# Patient Record
Sex: MALE | Race: BLACK OR AFRICAN AMERICAN | NOT HISPANIC OR LATINO | Employment: FULL TIME | URBAN - METROPOLITAN AREA
[De-identification: names, ages, dates, MRNs, and addresses within clinical notes are randomized per-mention and may not be internally consistent; named-entity substitution may affect disease eponyms.]

---

## 2018-06-04 ENCOUNTER — HOSPITAL ENCOUNTER (EMERGENCY)
Facility: CLINIC | Age: 33
Discharge: HOME | End: 2018-06-05
Attending: EMERGENCY MEDICINE | Admitting: EMERGENCY MEDICINE
Payer: COMMERCIAL

## 2018-06-04 DIAGNOSIS — R10.32 LEFT INGUINAL PAIN: ICD-10-CM

## 2018-06-04 DIAGNOSIS — L03.116 CELLULITIS OF LEFT LOWER EXTREMITY: ICD-10-CM

## 2018-06-04 DIAGNOSIS — R73.9 HYPERGLYCEMIA: ICD-10-CM

## 2018-06-04 LAB
BASOPHILS # BLD AUTO: 0.1 10E9/L (ref 0–0.2)
BASOPHILS NFR BLD AUTO: 0.8 %
DIFFERENTIAL METHOD BLD: NORMAL
EOSINOPHIL # BLD AUTO: 0.1 10E9/L (ref 0–0.7)
EOSINOPHIL NFR BLD AUTO: 1.4 %
ERYTHROCYTE [DISTWIDTH] IN BLOOD BY AUTOMATED COUNT: 13 % (ref 10–15)
GLUCOSE BLDC GLUCOMTR-MCNC: 381 MG/DL (ref 70–99)
HCT VFR BLD AUTO: 49 % (ref 40–53)
HGB BLD-MCNC: 16.4 G/DL (ref 13.3–17.7)
IMM GRANULOCYTES # BLD: 0 10E9/L (ref 0–0.4)
IMM GRANULOCYTES NFR BLD: 0.3 %
LACTATE BLD-SCNC: 1.2 MMOL/L (ref 0.7–2)
LYMPHOCYTES # BLD AUTO: 3.1 10E9/L (ref 0.8–5.3)
LYMPHOCYTES NFR BLD AUTO: 44 %
MCH RBC QN AUTO: 29.2 PG (ref 26.5–33)
MCHC RBC AUTO-ENTMCNC: 33.5 G/DL (ref 31.5–36.5)
MCV RBC AUTO: 87 FL (ref 78–100)
MONOCYTES # BLD AUTO: 0.5 10E9/L (ref 0–1.3)
MONOCYTES NFR BLD AUTO: 6.8 %
NEUTROPHILS # BLD AUTO: 3.3 10E9/L (ref 1.6–8.3)
NEUTROPHILS NFR BLD AUTO: 46.7 %
NRBC # BLD AUTO: 0 10*3/UL
NRBC BLD AUTO-RTO: 0 /100
PLATELET # BLD AUTO: 194 10E9/L (ref 150–450)
RBC # BLD AUTO: 5.61 10E12/L (ref 4.4–5.9)
WBC # BLD AUTO: 7.1 10E9/L (ref 4–11)

## 2018-06-04 PROCEDURE — 83605 ASSAY OF LACTIC ACID: CPT | Performed by: EMERGENCY MEDICINE

## 2018-06-04 PROCEDURE — 99284 EMERGENCY DEPT VISIT MOD MDM: CPT | Mod: 25

## 2018-06-04 PROCEDURE — 25000128 H RX IP 250 OP 636: Performed by: EMERGENCY MEDICINE

## 2018-06-04 PROCEDURE — 36415 COLL VENOUS BLD VENIPUNCTURE: CPT | Performed by: EMERGENCY MEDICINE

## 2018-06-04 PROCEDURE — 25000131 ZZH RX MED GY IP 250 OP 636 PS 637: Performed by: EMERGENCY MEDICINE

## 2018-06-04 PROCEDURE — 87040 BLOOD CULTURE FOR BACTERIA: CPT | Performed by: EMERGENCY MEDICINE

## 2018-06-04 PROCEDURE — 96365 THER/PROPH/DIAG IV INF INIT: CPT

## 2018-06-04 PROCEDURE — 85025 COMPLETE CBC W/AUTO DIFF WBC: CPT | Performed by: EMERGENCY MEDICINE

## 2018-06-04 PROCEDURE — 00000146 ZZHCL STATISTIC GLUCOSE BY METER IP

## 2018-06-04 PROCEDURE — 80048 BASIC METABOLIC PNL TOTAL CA: CPT | Performed by: EMERGENCY MEDICINE

## 2018-06-04 RX ORDER — SODIUM CHLORIDE 9 MG/ML
1000 INJECTION, SOLUTION INTRAVENOUS CONTINUOUS
Status: DISCONTINUED | OUTPATIENT
Start: 2018-06-04 | End: 2018-06-05 | Stop reason: HOSPADM

## 2018-06-04 RX ORDER — ONDANSETRON 2 MG/ML
4 INJECTION INTRAMUSCULAR; INTRAVENOUS
Status: DISCONTINUED | OUTPATIENT
Start: 2018-06-04 | End: 2018-06-05 | Stop reason: HOSPADM

## 2018-06-04 RX ORDER — MORPHINE SULFATE 4 MG/ML
4 INJECTION, SOLUTION INTRAMUSCULAR; INTRAVENOUS
Status: DISCONTINUED | OUTPATIENT
Start: 2018-06-04 | End: 2018-06-05

## 2018-06-04 RX ORDER — LIDOCAINE 40 MG/G
CREAM TOPICAL
Status: DISCONTINUED | OUTPATIENT
Start: 2018-06-04 | End: 2018-06-05 | Stop reason: HOSPADM

## 2018-06-04 RX ADMIN — TAZOBACTAM SODIUM AND PIPERACILLIN SODIUM 3.38 G: 375; 3 INJECTION, SOLUTION INTRAVENOUS at 23:48

## 2018-06-04 RX ADMIN — INSULIN HUMAN 16 UNITS: 100 INJECTION, SOLUTION PARENTERAL at 23:50

## 2018-06-04 RX ADMIN — SODIUM CHLORIDE 1000 ML: 9 INJECTION, SOLUTION INTRAVENOUS at 23:47

## 2018-06-04 ASSESSMENT — ENCOUNTER SYMPTOMS: DYSURIA: 1

## 2018-06-04 NOTE — ED AVS SNAPSHOT
[facility] Emergency Department    [address] [place] [address]    Phone:  [phone]    Fax:  [phone]                                       Abrahan Tucker   MRN: [id number]    Department:  [facility] Emergency Department   Date of Visit:  6/4/2018           Patient Information     Date Of Birth          1985        Your diagnoses for this visit were:     Cellulitis of left lower extremity     Hyperglycemia     Left inguinal pain        You were seen by Parmjit Schulz MD and Abelino Heaton MD.      Follow-up Information     Follow up with [facility]cd[facility]. Schedule an appointment as soon as possible for a visit in 2 days.    Specialty:  Family Practice    Contact information:    [facility]  [address]   [facility] [address]  [phone]          Discharge Instructions         Discharge Instructions  Cellulitis    Cellulitis is an infection of the skin that occurs when bacteria enter the skin.   Symptoms are generally redness, swelling, warmth and pain.  Your infection appeared to be appropriate to treat at home with antibiotics.  However, sometimes your infection may be worse than it seemed at first, or may worsen with time. If you have new or worse symptoms, you may need to be seen again in the Emergency Department or by your primary doctor.    Return to the Emergency Department if:    The redness, pain, or swelling gets a lot worse.  If the red area was marked, return if it is red beyond the marked area.    You are unable to get your antibiotics, or are vomiting them up or you can t take them.    You are feeling more ill, weak or lightheaded.    You start to run a new fever (temperature >101).    Anything else about the infection worries or concerns you.  Treatment:    Start your antibiotics right away, and take them as prescribed. Be sure to finish the whole prescription, even if you are better.    Apply a heating pad, warm  "packs, or warm water soaks to the infected area for 15 minutes at a time, at least 3 times a day. Do not use a heating pad on your feet or legs if you have diabetes. Do not sleep with a heating pad on, since this can cause burns or skin injury.    Rest your injured area for at least 1-2 days. After that you may start using your extremity again as long as there is not too much pain.     Raise the injured area above the level of your heart as much as possible in the first 1-2 days.    Tylenol  (acetaminophen), Motrin  (ibuprofen), or Advil  (ibuprofen) may help may help reduce pain and fever and may help you feel more comfortable. Be sure to read and follow the package directions, and ask your doctor if you have questions.    Follow-up with your doctor:    Re-check in clinic within 2-3 days.  Probiotics: If you have been given an antibiotic, you may want to also take a probiotic pill or eat yogurt with live cultures. Probiotics have \"good bacteria\" to help your intestines stay healthy. Studies have shown that probiotics help prevent diarrhea and other intestine problems (including C. diff infection) when you take antibiotics. You can buy these without a prescription in the pharmacy section of the store.     If you were given a prescription for medicine here today, be sure to read all of the information (including the package insert) that comes with your prescription.  This will include important information about the medicine, its side effects, and any warnings that you need to know about.  The pharmacist who fills the prescription can provide more information and answer questions you may have about the medicine.  If you have questions or concerns that the pharmacist cannot address, please call or return to the Emergency Department.     Opioid Medication Information    Pain medications are among the most commonly prescribed medicines, so we are including this information for all our patients. If you did not receive " pain medication or get a prescription for pain medicine, you can ignore it.     You may have been given a prescription for an opioid (narcotic) pain medicine and/or have received a pain medicine while here in the Emergency Department. These medicines can make you drowsy or impaired. You must not drive, operate dangerous equipment, or engage in any other dangerous activities while taking these medications. If you drive while taking these medications, you could be arrested for DUI, or driving under the influence. Do not drink any alcohol while you are taking these medications.     Opioid pain medications can cause addiction. If you have a history of chemical dependency of any type, you are at a higher risk of becoming addicted to pain medications.  Only take these prescribed medications to treat your pain when all other options have been tried. Take it for as short a time and as few doses as possible. Store your pain pills in a secure place, as they are frequently stolen and provide a dangerous opportunity for children or visitors in your house to start abusing these powerful medications. We will not replace any lost or stolen medicine.  As soon as your pain is better, you should flush all your remaining medication.     Many prescription pain medications contain Tylenol  (acetaminophen), including Vicodin , Tylenol #3 , Norco , Lortab , and Percocet .  You should not take any extra pills of Tylenol  if you are using these prescription medications or you can get very sick.  Do not ever take more than 3000 mg of acetaminophen in any 24 hour period.    All opioids tend to cause constipation. Drink plenty of water and eat foods that have a lot of fiber, such as fruits, vegetables, prune juice, apple juice and high fiber cereal.  Take a laxative if you don t move your bowels at least every other day. Miralax , Milk of Magnesia, Colace , or Senna  can be used to keep you regular.      Remember that you can always come back  to the Emergency Department if you are not able to see your regular doctor in the amount of time listed above, if you get any new symptoms, or if there is anything that worries you.        24 Hour Appointment Hotline       To make an appointment at any [facility], call [phone] ([phone]). If you don't have a family doctor or clinic, we will help you find one. [place] clinics are conveniently located to serve the needs of you and your family.             Review of your medicines      START taking        Dose / Directions Last dose taken    amoxicillin-clavulanate 875-125 MG per tablet   Commonly known as:  AUGMENTIN   Dose:  1 tablet   Quantity:  28 tablet        Take 1 tablet by mouth 2 times daily   Refills:  0        HYDROcodone-acetaminophen 5-325 MG per tablet   Commonly known as:  NORCO   Dose:  1 tablet   Quantity:  18 tablet        Take 1 tablet by mouth every 4 hours as needed for pain   Refills:  0          Our records show that you are taking the medicines listed below. If these are incorrect, please call your family doctor or clinic.        Dose / Directions Last dose taken    B-D U/F 31G X 8 MM   Generic drug:  insulin pen needle        Refills:  3        LANTUS SOLOSTAR 100 UNIT/ML injection   Generic drug:  insulin glargine        Refills:  0        terbinafine 250 MG tablet   Commonly known as:  lamISIL   Dose:  250 mg   Quantity:  90 tablet        Take 1 tablet (250 mg) by mouth daily   Refills:  0                Information about OPIOIDS     PRESCRIPTION OPIOIDS: WHAT YOU NEED TO KNOW   You have a prescription for an opioid (narcotic) pain medicine. Opioids can cause addiction. If you have a history of chemical dependency of any type, you are at a higher risk of becoming addicted to opioids. Only take this medicine after all other options have been tried. Take it for as short a time and as few doses as possible.     Do not:    Drive. If you drive while taking these medicines, you  could be arrested for driving under the influence (DUI).    Operate heavy machinery    Do any other dangerous activities while taking these medicines.     Drink any alcohol while taking these medicines.      Take with any other medicines that contain acetaminophen. Read all labels carefully. Look for the word  acetaminophen  or  Tylenol.  Ask your pharmacist if you have questions or are unsure.    Store your pills in a secure place, locked if possible. We will not replace any lost or stolen medicine. If you don t finish your medicine, please throw away (dispose) as directed by your pharmacist. The [place] [employer] has more information about safe disposal: https://www.pca.[facility].us/living-green/managing-unwanted-medications    All opioids tend to cause constipation. Drink plenty of water and eat foods that have a lot of fiber, such as fruits, vegetables, prune juice, apple juice and high-fiber cereal. Take a laxative (Miralax, milk of magnesia, Colace, Senna) if you don t move your bowels at least every other day.         Prescriptions were sent or printed at these locations (2 Prescriptions)                   Other Prescriptions                Printed at Department/Unit printer (2 of 2)         amoxicillin-clavulanate (AUGMENTIN) 875-125 MG per tablet               HYDROcodone-acetaminophen (NORCO) 5-325 MG per tablet                Procedures and tests performed during your visit     Procedure/Test Number of Times Performed    Basic metabolic panel 1    Blood culture 2    CBC with platelets differential 1    Glucose by meter 3    Lactic acid whole blood 1    Peripheral IV catheter 1    US Testicular & Scrotum w Doppler Ltd 1      Orders Needing Specimen Collection     None      Pending Results     Date and Time Order Name Status Description    6/4/2018 2331 Blood culture Preliminary     6/4/2018 2323 Blood culture Preliminary             Pending Culture Results     Date and Time Order Name  Status Description    6/4/2018 2331 Blood culture Preliminary     6/4/2018 2323 Blood culture Preliminary             Pending Results Instructions     If you had any lab results that were not finalized at the time of your Discharge, you can call the ED Lab Result RN at [phone]. You will be contacted by this team for any positive Lab results or changes in treatment. The nurses are available 7 days a week from 10A to 6:30P.  You can leave a message 24 hours per day and they will return your call.        Test Results From Your Hospital Stay        6/4/2018 11:01 PM      Component Results     Component Value Ref Range & Units Status    Glucose 381 (H) 70 - 99 mg/dL Final         6/4/2018 11:48 PM      Component Results     Component Value Ref Range & Units Status    WBC 7.1 4.0 - 11.0 10e9/L Final    RBC Count 5.61 4.4 - 5.9 10e12/L Final    Hemoglobin 16.4 13.3 - 17.7 g/dL Final    Hematocrit 49.0 40.0 - 53.0 % Final    MCV 87 78 - 100 fl Final    MCH 29.2 26.5 - 33.0 pg Final    MCHC 33.5 31.5 - 36.5 g/dL Final    RDW 13.0 10.0 - 15.0 % Final    Platelet Count 194 150 - 450 10e9/L Final    Diff Method Automated Method  Final    % Neutrophils 46.7 % Final    % Lymphocytes 44.0 % Final    % Monocytes 6.8 % Final    % Eosinophils 1.4 % Final    % Basophils 0.8 % Final    % Immature Granulocytes 0.3 % Final    Nucleated RBCs 0 0 /100 Final    Absolute Neutrophil 3.3 1.6 - 8.3 10e9/L Final    Absolute Lymphocytes 3.1 0.8 - 5.3 10e9/L Final    Absolute Monocytes 0.5 0.0 - 1.3 10e9/L Final    Absolute Eosinophils 0.1 0.0 - 0.7 10e9/L Final    Absolute Basophils 0.1 0.0 - 0.2 10e9/L Final    Abs Immature Granulocytes 0.0 0 - 0.4 10e9/L Final    Absolute Nucleated RBC 0.0  Final         6/5/2018 12:04 AM      Component Results     Component Value Ref Range & Units Status    Sodium 137 133 - 144 mmol/L Final    Potassium 4.1 3.4 - 5.3 mmol/L Final    Chloride 105 94 - 109 mmol/L Final    Carbon Dioxide 25 20 - 32 mmol/L  Final    Anion Gap 7 3 - 14 mmol/L Final    Glucose 420 (H) 70 - 99 mg/dL Final    Urea Nitrogen 14 7 - 30 mg/dL Final    Creatinine 0.94 0.66 - 1.25 mg/dL Final    GFR Estimate >90 >60 mL/min/1.7m2 Final    Non  GFR Calc    GFR Estimate If Black >90 >60 mL/min/1.7m2 Final    African American GFR Calc    Calcium 8.5 8.5 - 10.1 mg/dL Final         6/4/2018 11:51 PM      Component Results     Component Value Ref Range & Units Status    Lactic Acid 1.2 0.7 - 2.0 mmol/L Final         6/5/2018  1:44 AM      Component Results     Component    Specimen Description    Blood Right Arm    Special Requests    Aerobic and anaerobic bottles received    Culture Micro    PENDING         6/5/2018  1:45 AM      Component Results     Component    Specimen Description    Blood Left Arm    Special Requests    Aerobic and anaerobic bottles received    Culture Micro    PENDING         6/5/2018  3:08 AM      Narrative     US TESTICULAR AND SCROTUM WITH DOPPLER LIMITED  6/5/2018 2:44 AM     INDICATION: Scrotal pain and swelling, evaluate for abscess.    COMPARISON: None.    FINDINGS: Scrotal ultrasound demonstrates normal testicles. No  testicular masses. Doppler color and waveform analysis demonstrates  normal blood flow to both testes. Epididymides are normal bilaterally.  Small left varicocele and hydrocele. There is also a small right  hydrocele, slightly complex with a few low-level internal echoes.        Impression     IMPRESSION:  1. No acute findings. No testicular mass or torsion.  2. Small hydroceles and small left varicocele.    REZA GUSMAN MD         6/5/2018  1:04 AM      Component Results     Component Value Ref Range & Units Status    Glucose 509 (HH) 70 - 99 mg/dL Final         6/5/2018  2:53 AM      Component Results     Component Value Ref Range & Units Status    Glucose 431 (H) 70 - 99 mg/dL Final                Clinical Quality Measure: Blood Pressure Screening     Your blood pressure was checked  "while you were in the emergency department today. The last reading we obtained was  BP: 144/86 . Please read the guidelines below about what these numbers mean and what you should do about them.  If your systolic blood pressure (the top number) is less than 120 and your diastolic blood pressure (the bottom number) is less than 80, then your blood pressure is normal. There is nothing more that you need to do about it.  If your systolic blood pressure (the top number) is 120-139 or your diastolic blood pressure (the bottom number) is 80-89, your blood pressure may be higher than it should be. You should have your blood pressure rechecked within a year by a primary care provider.  If your systolic blood pressure (the top number) is 140 or greater or your diastolic blood pressure (the bottom number) is 90 or greater, you may have high blood pressure. High blood pressure is treatable, but if left untreated over time it can put you at risk for heart attack, stroke, or kidney failure. You should have your blood pressure rechecked by a primary care provider within the next 4 weeks.  If your provider in the emergency department today gave you specific instructions to follow-up with your doctor or provider even sooner than that, you should follow that instruction and not wait for up to 4 weeks for your follow-up visit.        Thank you for choosing [facility]       Thank you for choosing [facility] for your care. Our goal is always to provide you with excellent care. Hearing back from our patients is one way we can continue to improve our services. Please take a few minutes to complete the written survey that you may receive in the mail after you visit with us. Thank you!        [facility] Information     [employer] lets you send messages to your doctor, view your test results, renew your prescriptions, schedule appointments and more. To sign up, go to [email or web address] . Click on \"Log in\" on the left side of the screen, which " "will take you to the Welcome page. Then click on \"Sign up Now\" on the right side of the page.     You will be asked to enter the access code listed below, as well as some personal information. Please follow the directions to create your username and password.     Your access code is: [email or web address]  Expires: 9/3/2018  3:16 AM     Your access code will  in 90 days. If you need help or a new code, please call your [facility] clinic or [phone].        Care EveryWhere ID     This is your Care EveryWhere ID. This could be used by other organizations to access your [facility] medical records  [id number]        Equal Access to Services     JANELLE ORTEGA : Lilibeth Judge, claudette barry, tracy pagan, stewart casas. So [facility] [phone].    ATENCIÓN: Si habla español, tiene a shaikh disposición servicios gratuitos de asistencia lingüística. Llame al [phone].    We comply with applicable federal civil rights laws and [place] laws. We do not discriminate on the basis of race, color, national origin, age, disability, sex, sexual orientation, or gender identity.            After Visit Summary       This is your record. Keep this with you and show to your community pharmacist(s) and doctor(s) at your next visit.                  "

## 2018-06-04 NOTE — ED AVS SNAPSHOT
[facility] Emergency Department    [address]    [facility] [address]    Phone:  [phone]    Fax:  [phone]                                       Abrahan Tucker   MRN: [id number]    Department:  [facility] Emergency Department   Date of Visit:  6/4/2018           After Visit Summary Signature Page     I have received my discharge instructions, and my questions have been answered. I have discussed any challenges I see with this plan with the nurse or doctor.    ..........................................................................................................................................  Patient/Patient Representative Signature      ..........................................................................................................................................  Patient Representative Print Name and Relationship to Patient    ..................................................               ................................................  Date                                            Time    ..........................................................................................................................................  Reviewed by Signature/Title    ...................................................              ..............................................  Date                                                            Time

## 2018-06-05 ENCOUNTER — APPOINTMENT (OUTPATIENT)
Dept: ULTRASOUND IMAGING | Facility: CLINIC | Age: 33
End: 2018-06-05
Attending: EMERGENCY MEDICINE
Payer: COMMERCIAL

## 2018-06-05 VITALS
RESPIRATION RATE: 16 BRPM | WEIGHT: 250 LBS | HEART RATE: 90 BPM | DIASTOLIC BLOOD PRESSURE: 86 MMHG | SYSTOLIC BLOOD PRESSURE: 144 MMHG | BODY MASS INDEX: 37.89 KG/M2 | OXYGEN SATURATION: 99 % | HEIGHT: 68 IN | TEMPERATURE: 98 F

## 2018-06-05 LAB
ANION GAP SERPL CALCULATED.3IONS-SCNC: 7 MMOL/L (ref 3–14)
BUN SERPL-MCNC: 14 MG/DL (ref 7–30)
CALCIUM SERPL-MCNC: 8.5 MG/DL (ref 8.5–10.1)
CHLORIDE SERPL-SCNC: 105 MMOL/L (ref 94–109)
CO2 SERPL-SCNC: 25 MMOL/L (ref 20–32)
CREAT SERPL-MCNC: 0.94 MG/DL (ref 0.66–1.25)
GFR SERPL CREATININE-BSD FRML MDRD: >90 ML/MIN/1.7M2
GLUCOSE BLDC GLUCOMTR-MCNC: 431 MG/DL (ref 70–99)
GLUCOSE BLDC GLUCOMTR-MCNC: 509 MG/DL (ref 70–99)
GLUCOSE SERPL-MCNC: 420 MG/DL (ref 70–99)
POTASSIUM SERPL-SCNC: 4.1 MMOL/L (ref 3.4–5.3)
SODIUM SERPL-SCNC: 137 MMOL/L (ref 133–144)

## 2018-06-05 PROCEDURE — 96375 TX/PRO/DX INJ NEW DRUG ADDON: CPT

## 2018-06-05 PROCEDURE — 25000131 ZZH RX MED GY IP 250 OP 636 PS 637: Performed by: EMERGENCY MEDICINE

## 2018-06-05 PROCEDURE — 93976 VASCULAR STUDY: CPT

## 2018-06-05 PROCEDURE — 25000128 H RX IP 250 OP 636: Performed by: EMERGENCY MEDICINE

## 2018-06-05 PROCEDURE — 96361 HYDRATE IV INFUSION ADD-ON: CPT

## 2018-06-05 PROCEDURE — 00000146 ZZHCL STATISTIC GLUCOSE BY METER IP

## 2018-06-05 RX ORDER — KETOROLAC TROMETHAMINE 30 MG/ML
30 INJECTION, SOLUTION INTRAMUSCULAR; INTRAVENOUS ONCE
Status: COMPLETED | OUTPATIENT
Start: 2018-06-05 | End: 2018-06-05

## 2018-06-05 RX ORDER — HYDROCODONE BITARTRATE AND ACETAMINOPHEN 5; 325 MG/1; MG/1
1 TABLET ORAL EVERY 4 HOURS PRN
Qty: 18 TABLET | Refills: 0 | Status: SHIPPED | OUTPATIENT
Start: 2018-06-05 | End: 2024-02-02

## 2018-06-05 RX ADMIN — INSULIN HUMAN 16 UNITS: 100 INJECTION, SOLUTION PARENTERAL at 01:31

## 2018-06-05 RX ADMIN — SODIUM CHLORIDE 1000 ML: 9 INJECTION, SOLUTION INTRAVENOUS at 01:14

## 2018-06-05 RX ADMIN — KETOROLAC TROMETHAMINE 30 MG: 30 INJECTION, SOLUTION INTRAMUSCULAR at 00:13

## 2018-06-05 NOTE — ED PROVIDER NOTES
"  History     Chief Complaint:  Rash      HPI   Abrahan Tucker is a 32 year old male with a history of insulin dependent diabetes who presents with rash. The patient reports that for the past 90 days he has not been on insulin due to lack of insurance coverage but since last Thursday has been placed on Medica. He further reports that three days ago he developed a penile rash that advanced to general penile soreness the day afterwards. He further reports that today he noticed the rash has spread to his inner thigh and scrotum area, prompting his presentation here. He states that today it started burning when he urinates. He denies penile discharge.     Allergies:  No known drug allergies    Medications:    Lamisil  Lantus Solostar    Past Medical History:    Diabetes    Past Surgical History:    History reviewed. No pertinent surgical history.    Family History:    History reviewed. No pertinent family history.     Social History:  Marital Status:  Single   Tobacco Status: Never Used  Alcohol Use: No  Presents: Alone          Review of Systems   Genitourinary: Positive for dysuria and penile pain. Negative for discharge.   Skin: Positive for rash.   All other systems reviewed and are negative.    Physical Exam   First Vitals:  BP: 144/86  Pulse: 90  Temp: 98  F (36.7  C)  Resp: 16  Height: 172.7 cm (5' 8\")  Weight: 113.4 kg (250 lb)  SpO2: 98 %      Physical Exam  General: The patient is alert, in no respiratory distress.    HENT: Mucous membranes moist.    Cardiovascular: Regular rate and rhythm. Good pulses in all four extremities. Normal capillary refill and skin turgor.     Respiratory: Lungs are clear. No nasal flaring. No retractions. No wheezing, no crackles.    Gastrointestinal: Abdomen soft. No guarding, no rebound. No palpable hernias.     Musculoskeletal: No gross deformity.     Skin: No rashes or petechiae. Erythema over his medial left thigh.     Neurologic: The patient is alert and oriented x3. GCS " 15. No testable cranial nerve deficit. Follows commands with clear and appropriate speech. Gives appropriate answers. Good strength in all extremities. No gross neurologic deficit. Gross sensation intact. Pupils are round and reactive. No meningismus.     Lymphatic: No cervical adenopathy. No lower extremity swelling.    Psychiatric: The patient is non-tearful.    : Tenderness, swelling, and erythema over his left lateral testicle.     Emergency Department Course     Imaging:  Radiographic findings were communicated with the patient who voiced understanding of the findings.  US Testicular & Scrotum w/ Doppler Limited:  Pending.  Results Per Radiology.     Laboratory:  (0241): Glucose By Meter: 431 (H)  (0053): Glucose By Meter: 509 (HH)  (2250): Glucose By Meter: 381 (H)  Blood Culture (Left Arm): Pending  Blood Culture (Right Arm): Pending  CBC: AWNL (WBC 7.1, HGB 16.4, )   (2326) BMP: Glucose 420 (H) o/w WNL (Creatinine 0.94)  Lactic Acid Whole Blood: 1.2    Interventions:  2347: 0.9% Sodium Chloride Bolus 1000 ml IV  2348: Zosyn Infusion 3.375 g IV  2350: Insulin Regular Injection 16 Units, Subcutaneous  0013: Toradol Injection 30 mg IV  0114: 0.9% Sodium Chloride Bolus 1000 ml IV  0131: Insulin Regular Injection 16 Units, Subcutaneous    Emergency Department Course:  Past medical records, nursing notes, and vitals reviewed.  2318: I performed an exam of the patient and obtained history, as documented above.   IV inserted. Blood drawn and sent to lab. The patient was placed on pulse oximetry monitoring.  The patient was sent for a Testicular & Scrotum w/ Doppler Limited US while in the emergency department, findings above.  2348: IV Zosyn administered.   0138: Signed off to attending Abelino Heaton MD.     Impression & Plan      Medical Decision Making:  The patient is a diabetic who has been off his medications for several months. He presented with blood sugars that have gone up with is likely  secondary to his diet. He has not followed up with anyone. He does present with a days worth of rash and pain to his testicle. I did consider this could be Dante's Gangrene but I think this is more likely cellulitis. There is no signs of any significant, topical abscess. He does have some inguinal adenopathy. There is some minimal redness. His labs are quite reassuring with normal white count and normal lactic acid. I did do an ultrasound to examine the scrotal contents to look for deeper tissues processes, and the patient was otherwise stable after IV fluids and insulin. Pending the ultrasound, I do anticipate that there is no abnormal findings. The patient will go home with close follow up with his primary care doctor and on antibiotics for cellulitis.     Diagnosis:    ICD-10-CM    1. Cellulitis of left lower extremity L03.116                   2. Hyperglycemia R73.9    3. Left inguinal pain R10.32        Disposition: Signed off to attending Abelino Heaton MD.       Discharge Medications:  Discharge Medication List as of 6/5/2018  3:17 AM      START taking these medications    Details   amoxicillin-clavulanate (AUGMENTIN) 875-125 MG per tablet Take 1 tablet by mouth 2 times daily, Disp-28 tablet, R-0, Local Print      HYDROcodone-acetaminophen (NORCO) 5-325 MG per tablet Take 1 tablet by mouth every 4 hours as needed for pain, Disp-18 tablet, R-0, Local Print      Insulin Lispro (Humalog Kwikpen) 100 UNIT/ML Injection         Medardo Velásquez  6/4/2018   [facility] EMERGENCY DEPARTMENT  I, Medardo Velásquez, am serving as a scribe at 11:18 PM on 6/4/2018 to document services personally performed by Parmjit Schulz MD based on my observations and the provider's statements to me.         Parmjit Schulz MD  06/18/18 7050

## 2018-06-05 NOTE — ED TRIAGE NOTES
Pt comes in d/t rash left groin inner thigh   Hx of diabetes  Has lost insurance and now has it back but has been out of insulin for 90 days  Her for eval   Blood sugar in triage 381

## 2018-06-11 LAB
BACTERIA SPEC CULT: NO GROWTH
BACTERIA SPEC CULT: NO GROWTH
Lab: NORMAL
Lab: NORMAL
SPECIMEN SOURCE: NORMAL
SPECIMEN SOURCE: NORMAL

## 2024-02-02 ENCOUNTER — HOSPITAL ENCOUNTER (OUTPATIENT)
Facility: CLINIC | Age: 39
Setting detail: OBSERVATION
Discharge: HOME | End: 2024-02-03
Attending: EMERGENCY MEDICINE | Admitting: EMERGENCY MEDICINE
Payer: COMMERCIAL

## 2024-02-02 DIAGNOSIS — E11.9 TYPE 2 DIABETES MELLITUS WITHOUT COMPLICATION, WITH LONG-TERM CURRENT USE OF INSULIN (H): Primary | ICD-10-CM

## 2024-02-02 DIAGNOSIS — K21.9 GASTROESOPHAGEAL REFLUX DISEASE, UNSPECIFIED WHETHER ESOPHAGITIS PRESENT: ICD-10-CM

## 2024-02-02 DIAGNOSIS — I10 HYPERTENSION, UNSPECIFIED TYPE: ICD-10-CM

## 2024-02-02 DIAGNOSIS — Z79.4 TYPE 2 DIABETES MELLITUS WITHOUT COMPLICATION, WITH LONG-TERM CURRENT USE OF INSULIN (H): Primary | ICD-10-CM

## 2024-02-02 DIAGNOSIS — N17.9 ACUTE KIDNEY INJURY (H): ICD-10-CM

## 2024-02-02 DIAGNOSIS — R79.89 ELEVATED TROPONIN: ICD-10-CM

## 2024-02-02 LAB
ALBUMIN SERPL BCG-MCNC: 4.1 G/DL (ref 3.5–5.2)
ALP SERPL-CCNC: 69 U/L (ref 40–150)
ALT SERPL W P-5'-P-CCNC: 30 U/L (ref 0–70)
ANION GAP SERPL CALCULATED.3IONS-SCNC: 13 MMOL/L (ref 7–15)
AST SERPL W P-5'-P-CCNC: 28 U/L (ref 0–45)
BASOPHILS # BLD AUTO: 0 10E3/UL (ref 0–0.2)
BASOPHILS NFR BLD AUTO: 1 %
BILIRUB SERPL-MCNC: 0.2 MG/DL
BUN SERPL-MCNC: 21.3 MG/DL (ref 6–20)
CALCIUM SERPL-MCNC: 10.3 MG/DL (ref 8.6–10)
CHLORIDE SERPL-SCNC: 106 MMOL/L (ref 98–107)
CREAT SERPL-MCNC: 1.42 MG/DL (ref 0.67–1.17)
DEPRECATED HCO3 PLAS-SCNC: 24 MMOL/L (ref 22–29)
EGFRCR SERPLBLD CKD-EPI 2021: 64 ML/MIN/1.73M2
EOSINOPHIL # BLD AUTO: 0.1 10E3/UL (ref 0–0.7)
EOSINOPHIL NFR BLD AUTO: 1 %
ERYTHROCYTE [DISTWIDTH] IN BLOOD BY AUTOMATED COUNT: 13.9 % (ref 10–15)
GLUCOSE SERPL-MCNC: 130 MG/DL (ref 70–99)
HCT VFR BLD AUTO: 39.4 % (ref 40–53)
HGB BLD-MCNC: 12.4 G/DL (ref 13.3–17.7)
IMM GRANULOCYTES # BLD: 0 10E3/UL
IMM GRANULOCYTES NFR BLD: 0 %
LIPASE SERPL-CCNC: 15 U/L (ref 13–60)
LYMPHOCYTES # BLD AUTO: 2.3 10E3/UL (ref 0.8–5.3)
LYMPHOCYTES NFR BLD AUTO: 36 %
MCH RBC QN AUTO: 27.7 PG (ref 26.5–33)
MCHC RBC AUTO-ENTMCNC: 31.5 G/DL (ref 31.5–36.5)
MCV RBC AUTO: 88 FL (ref 78–100)
MONOCYTES # BLD AUTO: 0.5 10E3/UL (ref 0–1.3)
MONOCYTES NFR BLD AUTO: 7 %
NEUTROPHILS # BLD AUTO: 3.6 10E3/UL (ref 1.6–8.3)
NEUTROPHILS NFR BLD AUTO: 55 %
NRBC # BLD AUTO: 0 10E3/UL
NRBC BLD AUTO-RTO: 0 /100
PLATELET # BLD AUTO: 282 10E3/UL (ref 150–450)
POTASSIUM SERPL-SCNC: 4 MMOL/L (ref 3.4–5.3)
PROT SERPL-MCNC: 7.8 G/DL (ref 6.4–8.3)
RBC # BLD AUTO: 4.47 10E6/UL (ref 4.4–5.9)
SODIUM SERPL-SCNC: 143 MMOL/L (ref 135–145)
TROPONIN T SERPL HS-MCNC: 42 NG/L
TROPONIN T SERPL HS-MCNC: 44 NG/L
WBC # BLD AUTO: 6.5 10E3/UL (ref 4–11)

## 2024-02-02 PROCEDURE — 84484 ASSAY OF TROPONIN QUANT: CPT | Performed by: EMERGENCY MEDICINE

## 2024-02-02 PROCEDURE — 83690 ASSAY OF LIPASE: CPT | Performed by: STUDENT IN AN ORGANIZED HEALTH CARE EDUCATION/TRAINING PROGRAM

## 2024-02-02 PROCEDURE — 99207 PR APP CREDIT; MD BILLING SHARED VISIT: CPT | Performed by: STUDENT IN AN ORGANIZED HEALTH CARE EDUCATION/TRAINING PROGRAM

## 2024-02-02 PROCEDURE — 250N000013 HC RX MED GY IP 250 OP 250 PS 637: Performed by: STUDENT IN AN ORGANIZED HEALTH CARE EDUCATION/TRAINING PROGRAM

## 2024-02-02 PROCEDURE — 99223 1ST HOSP IP/OBS HIGH 75: CPT | Mod: FS | Performed by: PHYSICIAN ASSISTANT

## 2024-02-02 PROCEDURE — 250N000009 HC RX 250: Performed by: STUDENT IN AN ORGANIZED HEALTH CARE EDUCATION/TRAINING PROGRAM

## 2024-02-02 PROCEDURE — 82962 GLUCOSE BLOOD TEST: CPT

## 2024-02-02 PROCEDURE — 258N000003 HC RX IP 258 OP 636: Performed by: EMERGENCY MEDICINE

## 2024-02-02 PROCEDURE — 80053 COMPREHEN METABOLIC PANEL: CPT | Performed by: EMERGENCY MEDICINE

## 2024-02-02 PROCEDURE — 85025 COMPLETE CBC W/AUTO DIFF WBC: CPT | Performed by: EMERGENCY MEDICINE

## 2024-02-02 PROCEDURE — G0378 HOSPITAL OBSERVATION PER HR: HCPCS

## 2024-02-02 PROCEDURE — 99284 EMERGENCY DEPT VISIT MOD MDM: CPT | Performed by: EMERGENCY MEDICINE

## 2024-02-02 PROCEDURE — 36415 COLL VENOUS BLD VENIPUNCTURE: CPT | Performed by: EMERGENCY MEDICINE

## 2024-02-02 PROCEDURE — 96360 HYDRATION IV INFUSION INIT: CPT

## 2024-02-02 PROCEDURE — 96361 HYDRATE IV INFUSION ADD-ON: CPT

## 2024-02-02 PROCEDURE — 93005 ELECTROCARDIOGRAM TRACING: CPT

## 2024-02-02 PROCEDURE — 250N000013 HC RX MED GY IP 250 OP 250 PS 637: Performed by: EMERGENCY MEDICINE

## 2024-02-02 RX ORDER — LATANOPROST 50 UG/ML
1 SOLUTION/ DROPS OPHTHALMIC AT BEDTIME
Status: DISCONTINUED | OUTPATIENT
Start: 2024-02-02 | End: 2024-02-03 | Stop reason: HOSPADM

## 2024-02-02 RX ORDER — FAMOTIDINE 10 MG
10 TABLET ORAL 3 TIMES DAILY PRN
Status: DISCONTINUED | OUTPATIENT
Start: 2024-02-02 | End: 2024-02-03 | Stop reason: HOSPADM

## 2024-02-02 RX ORDER — DEXTROSE MONOHYDRATE 25 G/50ML
25-50 INJECTION, SOLUTION INTRAVENOUS
Status: DISCONTINUED | OUTPATIENT
Start: 2024-02-02 | End: 2024-02-03 | Stop reason: HOSPADM

## 2024-02-02 RX ORDER — ATROPINE SULFATE 10 MG/ML
SOLUTION/ DROPS OPHTHALMIC
COMMUNITY

## 2024-02-02 RX ORDER — LATANOPROST 50 UG/ML
SOLUTION/ DROPS OPHTHALMIC
COMMUNITY

## 2024-02-02 RX ORDER — HYDRALAZINE HYDROCHLORIDE 20 MG/ML
10 INJECTION INTRAMUSCULAR; INTRAVENOUS EVERY 6 HOURS PRN
Status: DISCONTINUED | OUTPATIENT
Start: 2024-02-02 | End: 2024-02-02

## 2024-02-02 RX ORDER — LOSARTAN POTASSIUM 25 MG/1
25 TABLET ORAL EVERY EVENING
Status: DISCONTINUED | OUTPATIENT
Start: 2024-02-02 | End: 2024-02-03

## 2024-02-02 RX ORDER — DORZOLAMIDE HYDROCHLORIDE AND TIMOLOL MALEATE 20; 5 MG/ML; MG/ML
1 SOLUTION/ DROPS OPHTHALMIC 2 TIMES DAILY
Status: DISCONTINUED | OUTPATIENT
Start: 2024-02-02 | End: 2024-02-03 | Stop reason: HOSPADM

## 2024-02-02 RX ORDER — PEN NEEDLE, DIABETIC 32GX 5/32"
NEEDLE, DISPOSABLE MISCELLANEOUS
COMMUNITY
Start: 2024-01-03

## 2024-02-02 RX ORDER — HYDROCHLOROTHIAZIDE 25 MG/1
25 TABLET ORAL ONCE
Status: COMPLETED | OUTPATIENT
Start: 2024-02-02 | End: 2024-02-02

## 2024-02-02 RX ORDER — IBUPROFEN 800 MG/1
800 TABLET, FILM COATED ORAL ONCE
Status: COMPLETED | OUTPATIENT
Start: 2024-02-02 | End: 2024-02-02

## 2024-02-02 RX ORDER — METOPROLOL TARTRATE 1 MG/ML
5 INJECTION, SOLUTION INTRAVENOUS ONCE
Status: COMPLETED | OUTPATIENT
Start: 2024-02-02 | End: 2024-02-02

## 2024-02-02 RX ORDER — HYDRALAZINE HYDROCHLORIDE 10 MG/1
10 TABLET, FILM COATED ORAL EVERY 4 HOURS PRN
Status: DISCONTINUED | OUTPATIENT
Start: 2024-02-02 | End: 2024-02-03 | Stop reason: HOSPADM

## 2024-02-02 RX ORDER — HYDRALAZINE HYDROCHLORIDE 20 MG/ML
10 INJECTION INTRAMUSCULAR; INTRAVENOUS EVERY 4 HOURS PRN
Status: DISCONTINUED | OUTPATIENT
Start: 2024-02-02 | End: 2024-02-03 | Stop reason: HOSPADM

## 2024-02-02 RX ORDER — AMOXICILLIN 500 MG/1
500 TABLET, FILM COATED ORAL 3 TIMES DAILY
COMMUNITY

## 2024-02-02 RX ORDER — BRIMONIDINE TARTRATE 2 MG/ML
1 SOLUTION/ DROPS OPHTHALMIC 2 TIMES DAILY
Status: DISCONTINUED | OUTPATIENT
Start: 2024-02-02 | End: 2024-02-03 | Stop reason: HOSPADM

## 2024-02-02 RX ORDER — PANTOPRAZOLE SODIUM 40 MG/1
40 TABLET, DELAYED RELEASE ORAL
Status: DISCONTINUED | OUTPATIENT
Start: 2024-02-02 | End: 2024-02-03 | Stop reason: HOSPADM

## 2024-02-02 RX ORDER — BRIMONIDINE TARTRATE 2 MG/ML
1 SOLUTION/ DROPS OPHTHALMIC 2 TIMES DAILY
COMMUNITY

## 2024-02-02 RX ORDER — HYDRALAZINE HYDROCHLORIDE 20 MG/ML
10 INJECTION INTRAMUSCULAR; INTRAVENOUS EVERY 6 HOURS PRN
Status: DISCONTINUED | OUTPATIENT
Start: 2024-02-02 | End: 2024-02-03 | Stop reason: HOSPADM

## 2024-02-02 RX ORDER — INSULIN ASPART 100 [IU]/ML
26 INJECTION, SOLUTION INTRAVENOUS; SUBCUTANEOUS
COMMUNITY
Start: 2022-12-08 | End: 2024-02-03

## 2024-02-02 RX ORDER — NICOTINE POLACRILEX 4 MG
15-30 LOZENGE BUCCAL
Status: DISCONTINUED | OUTPATIENT
Start: 2024-02-02 | End: 2024-02-03 | Stop reason: HOSPADM

## 2024-02-02 RX ORDER — DORZOLAMIDE HYDROCHLORIDE AND TIMOLOL MALEATE 20; 5 MG/ML; MG/ML
SOLUTION/ DROPS OPHTHALMIC
COMMUNITY

## 2024-02-02 RX ORDER — PROCHLORPERAZINE 25 MG/1
SUPPOSITORY RECTAL
COMMUNITY
Start: 2024-01-08

## 2024-02-02 RX ORDER — ATROPINE SULFATE 10 MG/ML
1 SOLUTION/ DROPS OPHTHALMIC AT BEDTIME
Status: DISCONTINUED | OUTPATIENT
Start: 2024-02-02 | End: 2024-02-03 | Stop reason: HOSPADM

## 2024-02-02 RX ADMIN — PANTOPRAZOLE SODIUM 40 MG: 40 TABLET, DELAYED RELEASE ORAL at 23:58

## 2024-02-02 RX ADMIN — SODIUM CHLORIDE 1000 ML: 9 INJECTION, SOLUTION INTRAVENOUS at 21:11

## 2024-02-02 RX ADMIN — BRIMONIDINE TARTRATE 1 DROP: 2 SOLUTION OPHTHALMIC at 23:57

## 2024-02-02 RX ADMIN — LOSARTAN POTASSIUM 25 MG: 25 TABLET, FILM COATED ORAL at 23:58

## 2024-02-02 RX ADMIN — DORZOLAMIDE HYDROCHLORIDE AND TIMOLOL MALEATE 1 DROP: 20; 5 SOLUTION/ DROPS OPHTHALMIC at 23:58

## 2024-02-02 RX ADMIN — HYDROCHLOROTHIAZIDE 25 MG: 25 TABLET ORAL at 19:02

## 2024-02-02 ASSESSMENT — ACTIVITIES OF DAILY LIVING (ADL)
ADLS_ACUITY_SCORE: 35

## 2024-02-02 NOTE — ED TRIAGE NOTES
Presents for htn x 3 days on home monitor, 190-200s  Of note, pt is also fasting and has not eaten or drank anything sinceTuesday night  No headache or vision changes     Triage Assessment (Adult)       Row Name 02/02/24 1705          Triage Assessment    Airway WDL WDL        Respiratory WDL    Respiratory WDL WDL        Skin Circulation/Temperature WDL    Skin Circulation/Temperature WDL WDL        Cardiac WDL    Cardiac WDL WDL        Peripheral/Neurovascular WDL    Peripheral Neurovascular WDL WDL        Cognitive/Neuro/Behavioral WDL    Cognitive/Neuro/Behavioral WDL WDL

## 2024-02-03 VITALS
BODY MASS INDEX: 40.16 KG/M2 | HEART RATE: 59 BPM | DIASTOLIC BLOOD PRESSURE: 74 MMHG | RESPIRATION RATE: 18 BRPM | HEIGHT: 68 IN | WEIGHT: 265 LBS | OXYGEN SATURATION: 100 % | SYSTOLIC BLOOD PRESSURE: 134 MMHG | TEMPERATURE: 98.6 F

## 2024-02-03 LAB
ANION GAP SERPL CALCULATED.3IONS-SCNC: 16 MMOL/L (ref 7–15)
ATRIAL RATE - MUSE: 68 BPM
BUN SERPL-MCNC: 19.6 MG/DL (ref 6–20)
CA-I BLD-MCNC: 5.1 MG/DL (ref 4.4–5.2)
CALCIUM SERPL-MCNC: 9.6 MG/DL (ref 8.6–10)
CHLORIDE SERPL-SCNC: 101 MMOL/L (ref 98–107)
CHOLEST SERPL-MCNC: 190 MG/DL
CREAT SERPL-MCNC: 1.28 MG/DL (ref 0.67–1.17)
DEPRECATED HCO3 PLAS-SCNC: 21 MMOL/L (ref 22–29)
DIASTOLIC BLOOD PRESSURE - MUSE: NORMAL MMHG
EGFRCR SERPLBLD CKD-EPI 2021: 73 ML/MIN/1.73M2
ERYTHROCYTE [DISTWIDTH] IN BLOOD BY AUTOMATED COUNT: 13.7 % (ref 10–15)
FASTING STATUS PATIENT QL REPORTED: YES
GLUCOSE BLDC GLUCOMTR-MCNC: 119 MG/DL (ref 70–99)
GLUCOSE BLDC GLUCOMTR-MCNC: 137 MG/DL (ref 70–99)
GLUCOSE BLDC GLUCOMTR-MCNC: 159 MG/DL (ref 70–99)
GLUCOSE SERPL-MCNC: 148 MG/DL (ref 70–99)
HCT VFR BLD AUTO: 39.2 % (ref 40–53)
HDLC SERPL-MCNC: 39 MG/DL
HGB BLD-MCNC: 12.5 G/DL (ref 13.3–17.7)
INTERPRETATION ECG - MUSE: NORMAL
LDLC SERPL CALC-MCNC: 124 MG/DL
MCH RBC QN AUTO: 27.7 PG (ref 26.5–33)
MCHC RBC AUTO-ENTMCNC: 31.9 G/DL (ref 31.5–36.5)
MCV RBC AUTO: 87 FL (ref 78–100)
NONHDLC SERPL-MCNC: 151 MG/DL
P AXIS - MUSE: 7 DEGREES
PLATELET # BLD AUTO: 257 10E3/UL (ref 150–450)
POTASSIUM SERPL-SCNC: 3.9 MMOL/L (ref 3.4–5.3)
PR INTERVAL - MUSE: 104 MS
QRS DURATION - MUSE: 84 MS
QT - MUSE: 374 MS
QTC - MUSE: 397 MS
R AXIS - MUSE: 1 DEGREES
RBC # BLD AUTO: 4.52 10E6/UL (ref 4.4–5.9)
SODIUM SERPL-SCNC: 138 MMOL/L (ref 135–145)
SYSTOLIC BLOOD PRESSURE - MUSE: NORMAL MMHG
T AXIS - MUSE: 54 DEGREES
TRIGL SERPL-MCNC: 136 MG/DL
VENTRICULAR RATE- MUSE: 68 BPM
WBC # BLD AUTO: 6.3 10E3/UL (ref 4–11)

## 2024-02-03 PROCEDURE — 36415 COLL VENOUS BLD VENIPUNCTURE: CPT | Performed by: STUDENT IN AN ORGANIZED HEALTH CARE EDUCATION/TRAINING PROGRAM

## 2024-02-03 PROCEDURE — 82330 ASSAY OF CALCIUM: CPT | Performed by: STUDENT IN AN ORGANIZED HEALTH CARE EDUCATION/TRAINING PROGRAM

## 2024-02-03 PROCEDURE — G0378 HOSPITAL OBSERVATION PER HR: HCPCS

## 2024-02-03 PROCEDURE — 82465 ASSAY BLD/SERUM CHOLESTEROL: CPT | Performed by: STUDENT IN AN ORGANIZED HEALTH CARE EDUCATION/TRAINING PROGRAM

## 2024-02-03 PROCEDURE — 250N000012 HC RX MED GY IP 250 OP 636 PS 637: Performed by: STUDENT IN AN ORGANIZED HEALTH CARE EDUCATION/TRAINING PROGRAM

## 2024-02-03 PROCEDURE — 99239 HOSP IP/OBS DSCHRG MGMT >30: CPT | Performed by: PHYSICIAN ASSISTANT

## 2024-02-03 PROCEDURE — 96361 HYDRATE IV INFUSION ADD-ON: CPT

## 2024-02-03 PROCEDURE — 250N000013 HC RX MED GY IP 250 OP 250 PS 637: Performed by: PHYSICIAN ASSISTANT

## 2024-02-03 PROCEDURE — 85027 COMPLETE CBC AUTOMATED: CPT | Performed by: STUDENT IN AN ORGANIZED HEALTH CARE EDUCATION/TRAINING PROGRAM

## 2024-02-03 PROCEDURE — 82565 ASSAY OF CREATININE: CPT | Performed by: STUDENT IN AN ORGANIZED HEALTH CARE EDUCATION/TRAINING PROGRAM

## 2024-02-03 PROCEDURE — 82962 GLUCOSE BLOOD TEST: CPT

## 2024-02-03 PROCEDURE — 250N000013 HC RX MED GY IP 250 OP 250 PS 637: Performed by: STUDENT IN AN ORGANIZED HEALTH CARE EDUCATION/TRAINING PROGRAM

## 2024-02-03 RX ORDER — HYDROCHLOROTHIAZIDE 25 MG/1
25 TABLET ORAL DAILY
Qty: 30 TABLET | Refills: 0 | Status: SHIPPED | OUTPATIENT
Start: 2024-02-03

## 2024-02-03 RX ORDER — HYDROCHLOROTHIAZIDE 25 MG/1
25 TABLET ORAL DAILY
Status: DISCONTINUED | OUTPATIENT
Start: 2024-02-03 | End: 2024-02-03 | Stop reason: HOSPADM

## 2024-02-03 RX ORDER — PANTOPRAZOLE SODIUM 40 MG/1
40 TABLET, DELAYED RELEASE ORAL
Qty: 30 TABLET | Refills: 0 | Status: SHIPPED | OUTPATIENT
Start: 2024-02-04

## 2024-02-03 RX ORDER — INSULIN ASPART 100 [IU]/ML
4 INJECTION, SOLUTION INTRAVENOUS; SUBCUTANEOUS
DISCHARGE
Start: 2024-02-03

## 2024-02-03 RX ADMIN — PANTOPRAZOLE SODIUM 40 MG: 40 TABLET, DELAYED RELEASE ORAL at 08:07

## 2024-02-03 RX ADMIN — INSULIN GLARGINE 15 UNITS: 100 INJECTION, SOLUTION SUBCUTANEOUS at 00:00

## 2024-02-03 RX ADMIN — BRIMONIDINE TARTRATE 1 DROP: 2 SOLUTION OPHTHALMIC at 08:07

## 2024-02-03 RX ADMIN — HYDROCHLOROTHIAZIDE 25 MG: 25 TABLET ORAL at 10:23

## 2024-02-03 RX ADMIN — DORZOLAMIDE HYDROCHLORIDE AND TIMOLOL MALEATE 1 DROP: 20; 5 SOLUTION/ DROPS OPHTHALMIC at 08:07

## 2024-02-03 ASSESSMENT — ACTIVITIES OF DAILY LIVING (ADL)
ADLS_ACUITY_SCORE: 35

## 2024-02-03 NOTE — H&P
[facility]    History and Physical - Hospitalist Service, GOLD TEAM        Date of Admission:  2/2/2024    Assessment & Plan   Abrahan Tucker is a 38 year old male with PMH of HTN, insulin-dependent DM2 admitted on 2/2/2024 for hypertensive urgency.     Hypertensive urgency, concern for hypertensive emergency   Atypical chest pain  - Variable blood pressures, intermittently elevated x 1 year. Was seen at dental clinic a few days ago for dental infection and at that time was noted to have elevated blood pressure and was told they were not going to intervene with his HTN at the time. He has no significant pain or anxiety. He was instructed to go home and check blood pressure regularly. Patient has home cuff and previously he said his baseline was 160s systolic. At home today his BP was 200 systolic. Presented and blood pressure was similar. Noted mild troponin elevation of 44 without chest pain or ischemic changes on EKG. Cr elevated from prior, however, on review of Care Everywhere, Cr noted to be 1.43 in 11/2023 and 1.3 in 4/2023 and this is unlikely to represent acute injury. He describes recent reflux symptoms for the last month or so, these have no apparent exertional component. He was prescribed a medication for this but has not taken it regularly due to his fasting at certain times of the day. He has had recent lifestyle changes due to living with his mother, has gained significant weight from eating more processed and less balanced with her. He has also noted some increased swelling to his legs, no orthopnea or dyspnea.   - Noted HEART score of 3 points (low) and likely safe to discharge in the AM once antihypertensive plan has been established. No indication for further cardiac workup at this time if troponins remain stable.   Plan:   - Start Losartan 25mg PO daily tonight   - Trend troponins   - PRN hydralazine for SBP >200  - followup closely with PCP within 1  week of discharge    CKD   Cr noted to be 1.42 on admission, up from 0.94 on last check in 2018. Likely represents hypertensive vs diabetic renal disease. In the setting of diabetes, he shoul   - BMP in the AM   - Avoid additional NSAIDs    Insulin dependent type 2 diabetes mellitus, poorly controlled   Last A1c of 8.2 in 11/2023. Home regimen of Lantus 40 units nightly, 26 units Novolog with breakfast and 2 units per carb with other meals.   - PTA 40 units Lantus nightly  - 14 units Novolog with meals  - LDSSI, titrate as needed.   - TIC AC glucose checks    Presumed GERD  Epigastric burning pain in the last month. Was prescribed medication for this but has not taken it regularly due to his fasting at certain times of the day.  - start protonix 40 mg EC daily  - PRN protonix for reflux symptoms    Normocytic anemia  Hgb 12.4, likely inflammatory vs CKD in the setting of above.   - Iron studies   - PCP follow up     Elevated calcium   - add on ionized Ca and PTH     Retinal detachment   - PTA drops           Diet:  Regular  DVT Prophylaxis: Ambulate every shift  Stallings Catheter: Not present  Lines: None     Cardiac Monitoring: None  Code Status:  Full    Clinically Significant Risk Factors Present on Admission           # Hypercalcemia: Highest Ca = 10.3 mg/dL in last 2 days, will monitor as appropriate        # Hypertension: Noted on problem list                 Disposition Plan      Expected Discharge Date: 02/03/2024                FAVIO MensahC  [facility] [place]  2/2/2024 10:51 PM      Iliana Perez DO  Hospitalist Service, [facility]  Securely message with [employer] (more info)  Text page via [employer] Paging/Directory   See signed in provider for up to date coverage information    ______________________________________________________________________    Chief Complaint   Elevated blood pressure    History is obtained from the  patient    History of Present Illness   Abrahan Tucker is a 38 year old male who presents with elevated blood pressure. He was seen at dental clinic and was noted to have elevated blood pressure. Procedure was held off and he was told to go home and check blood pressures regularly. Has a BP cuff at home, elevated on repeated checks 200/104 -> transiently in the 170's and then back up on recheck prompting him to come to the ED. Never been this high by cuff check. Elevated throughout the past year 160's/80's highest he has had previously. He felt concerned and presented to ED. He has not had any headaches, chest pain, SOB. He has had some epigastric pain in the last month that he was seen in clinic for and was started on treatment for relux - hasn't started on this because he has been fasting frequently. Of note he has also had some subjective fevers and chills (tmax at home 98.3 f). This was felt to be due to dental infection - confirmed to have this by dentist recently.     Living with his mother since retinal detachment, no control of his diet with this. Eating high sodium and processed foods as this is all that has been available to him. Weight gain of 90 pounds over the past year. Has noted some leg swelling since then. No orthopnea or COTTER.       Past Medical History    Past Medical History:   Diagnosis Date    Diabetes (H)        Past Surgical History   No past surgical history on file.    Prior to Admission Medications   Prior to Admission Medications   Prescriptions Last Dose Informant Patient Reported? Taking?   BD PEN NEEDLE LUIS ALFREDO 2ND GEN 32G X 4 MM miscellaneous   Yes Yes   Sig: USE 6 TIMES DAILY AS DIRECTED   HYDROcodone-acetaminophen (NORCO) 5-325 MG per tablet   No No   Sig: Take 1 tablet by mouth every 4 hours as needed for pain   LANTUS SOLOSTAR 100 UNIT/ML soln   Yes No   amoxicillin-clavulanate (AUGMENTIN) 875-125 MG per tablet   No No   Sig: Take 1 tablet by mouth 2 times daily   insulin lispro  (HUMALOG KWIKPEN) 100 UNIT/ML injection 2024  No Yes   Si units with brkfst, 2u/carb with other meals   terbinafine (LAMISIL) 250 MG tablet   No No   Sig: Take 1 tablet (250 mg) by mouth daily      Facility-Administered Medications: None           Physical Exam   Vital Signs: Temp: 97.6  F (36.4  C) Temp src: Oral BP: (!) 220/102 Pulse: 68   Resp: 18 SpO2: 99 % O2 Device: None (Room air)    Weight: 0 lbs 0 oz    General: well developed, awake, alert, no acute distress  HEENT:  -Head: NC/AT;  -Eyes: PERRL, EOMI. No discharge or redness;  -Nose: Normal nares.  -Mouth and throat: MMM. Normal gums, mucosa, palate,. Good apparent dentition.  CV: RRR, no m/r/g. Extremities are warm and well perfused.   LUNGS: CTAB, no w/r/c.  ABD: Soft, NT/ND, NBS, no masses or organomegaly.  SKIN: Warm, well perfused. No skin rashes or abnormal lesions.  MSK: Normal gait. No deformities.  EXT: No clubbing, cyanosis. 1+ pitting edema to BLE.   NEURO: Alert and converses appropriately. No focal deficits. JOURDAN.     Medical Decision Making       75 MINUTES SPENT BY ME on the date of service doing chart review, history, exam, documentation & further activities per the note.      Data     I have personally reviewed the following data over the past 24 hrs:    6.5  \   12.4 (L)   / 282     143 106 21.3 (H) /  130 (H)   4.0 24 1.42 (H) \     ALT: 30 AST: 28 AP: 69 TBILI: 0.2   ALB: 4.1 TOT PROTEIN: 7.8 LIPASE: N/A     Trop: 42 (H) BNP: N/A

## 2024-02-03 NOTE — PLAN OF CARE
"/73   Pulse 59   Temp 98.7  F (37.1  C) (Oral)   Resp 16   Ht 1.727 m (5' 8\")   Wt 120.2 kg (265 lb)   SpO2 100%   BMI 40.29 kg/m     Time: 3711-7319  Patient is alert and oriented, independent, BP    at 0500 153/78, BG at 0200 137, denied pain or discomfort, continue to monitor increased BP.                  "

## 2024-02-03 NOTE — ED PROVIDER NOTES
ED PROVIDER NOTE  February 2, 2024  History     Chief Complaint   Patient presents with    Hypertension     HPI  Abrahan Tuckre is a 38 year old male who who has a history significant for type 2 diabetes arriving today to the emergency department evaluation of elevated blood pressure.  The patient states that recently has been seen by a dentist and believes this is the trigger for his elevated blood pressure.  He is out of the left lower posterior molar some localized dental discomfort.  He was evaluated by dentist and prescribed antibiotics today which she has not yet started.  He does report that pain has been persistent however somewhat improved with past several days.  Patient reports he did not have any dental surgery secondary to elevated blood pressure.  He has been turning his blood pressure over the past 1 week which has been upwards of 170 systolically and today in the 190 range.  He reports no other significant changes to severe headache, visual change, difficulty walking, unilateral change in strength or sensation or difficulty speaking.  He reports no chest pain or shortness of breath.  He does report a history of intermittent high blood pressure which has been variable in the past.  He states this has been noted by his PCP who stated he should monitor this.  The patient does admit to poor exercise and diet.  Patient does add that he is secondary to poor diet and living with his mother he has been intermittently fasting.      Past Medical History  Past Medical History:   Diagnosis Date    Diabetes (H)      No past surgical history on file.  insulin lispro (HUMALOG KWIKPEN) 100 UNIT/ML injection  amoxicillin-clavulanate (AUGMENTIN) 875-125 MG per tablet  B-D U/F 31G X 8 MM insulin pen needle  HYDROcodone-acetaminophen (NORCO) 5-325 MG per tablet  LANTUS SOLOSTAR 100 UNIT/ML soln  terbinafine (LAMISIL) 250 MG tablet      No Known Allergies  Family History  No family history on file.  Social History    Social History     Tobacco Use    Smoking status: Never    Smokeless tobacco: Never   Substance Use Topics    Alcohol use: No     Alcohol/week: 0.0 standard drinks of alcohol    Drug use: No         A medically appropriate review of systems was performed with pertinent positives and negatives noted in the HPI, and all other systems negative.      Physical Exam   BP: (!) 195/100  Pulse: 73  Temp: 97.6  F (36.4  C)  Resp: 18  SpO2: 98 %      Physical Exam  Vitals and nursing note reviewed.   Constitutional:       Appearance: He is not ill-appearing or diaphoretic.   HENT:      Head: Normocephalic and atraumatic.      Mouth/Throat:      Mouth: Mucous membranes are moist.      Pharynx: No oropharyngeal exudate or posterior oropharyngeal erythema.        Comments: Minor swelling less than 2 mm.  No evidence of gingival/gum infection.  Tenderness with palpation of left lower molar.  Cardiovascular:      Rate and Rhythm: Normal rate and regular rhythm.      Pulses: Normal pulses.      Heart sounds: No murmur heard.  Pulmonary:      Effort: Pulmonary effort is normal. No respiratory distress.      Breath sounds: No wheezing.   Musculoskeletal:         General: No deformity.   Skin:     Capillary Refill: Capillary refill takes less than 2 seconds.      Findings: No rash.   Neurological:      General: No focal deficit present.      Mental Status: He is alert and oriented to person, place, and time.      Cranial Nerves: No cranial nerve deficit.      Sensory: No sensory deficit.      Motor: No weakness.      Coordination: Coordination normal.         ED Course        Procedures         Results for orders placed or performed during the hospital encounter of 02/02/24 (from the past 24 hour(s))   EKG 12-lead, tracing only   Result Value Ref Range    Systolic Blood Pressure  mmHg    Diastolic Blood Pressure  mmHg    Ventricular Rate 68 BPM    Atrial Rate 68 BPM    NH Interval 104 ms    QRS Duration 84 ms     ms    QTc 397  ms    P Axis 7 degrees    R AXIS 1 degrees    T Axis 54 degrees    Interpretation ECG       Sinus rhythm with short ME  Possible Left atrial enlargement  Left ventricular hypertrophy  Abnormal ECG     CBC with platelets differential    Narrative    The following orders were created for panel order CBC with platelets differential.  Procedure                               Abnormality         Status                     ---------                               -----------         ------                     CBC with platelets and d...[[id number]]  Abnormal            Final result                 Please view results for these tests on the individual orders.   Comprehensive metabolic panel   Result Value Ref Range    Sodium 143 135 - 145 mmol/L    Potassium 4.0 3.4 - 5.3 mmol/L    Carbon Dioxide (CO2) 24 22 - 29 mmol/L    Anion Gap 13 7 - 15 mmol/L    Urea Nitrogen 21.3 (H) 6.0 - 20.0 mg/dL    Creatinine 1.42 (H) 0.67 - 1.17 mg/dL    GFR Estimate 64 >60 mL/min/1.73m2    Calcium 10.3 (H) 8.6 - 10.0 mg/dL    Chloride 106 98 - 107 mmol/L    Glucose 130 (H) 70 - 99 mg/dL    Alkaline Phosphatase 69 40 - 150 U/L    AST 28 0 - 45 U/L    ALT 30 0 - 70 U/L    Protein Total 7.8 6.4 - 8.3 g/dL    Albumin 4.1 3.5 - 5.2 g/dL    Bilirubin Total 0.2 <=1.2 mg/dL   Troponin T, High Sensitivity   Result Value Ref Range    Troponin T, High Sensitivity 44 (H) <=22 ng/L   CBC with platelets and differential   Result Value Ref Range    WBC Count 6.5 4.0 - 11.0 10e3/uL    RBC Count 4.47 4.40 - 5.90 10e6/uL    Hemoglobin 12.4 (L) 13.3 - 17.7 g/dL    Hematocrit 39.4 (L) 40.0 - 53.0 %    MCV 88 78 - 100 fL    MCH 27.7 26.5 - 33.0 pg    MCHC 31.5 31.5 - 36.5 g/dL    RDW 13.9 10.0 - 15.0 %    Platelet Count 282 150 - 450 10e3/uL    % Neutrophils 55 %    % Lymphocytes 36 %    % Monocytes 7 %    % Eosinophils 1 %    % Basophils 1 %    % Immature Granulocytes 0 %    NRBCs per 100 WBC 0 <1 /100    Absolute Neutrophils 3.6 1.6 - 8.3 10e3/uL    Absolute  Lymphocytes 2.3 0.8 - 5.3 10e3/uL    Absolute Monocytes 0.5 0.0 - 1.3 10e3/uL    Absolute Eosinophils 0.1 0.0 - 0.7 10e3/uL    Absolute Basophils 0.0 0.0 - 0.2 10e3/uL    Absolute Immature Granulocytes 0.0 <=0.4 10e3/uL    Absolute NRBCs 0.0 10e3/uL     Medications   metoprolol (LOPRESSOR) injection 5 mg (has no administration in time range)   sodium chloride 0.9% BOLUS 1,000 mL (has no administration in time range)   hydrochlorothiazide (HYDRODIURIL) tablet 25 mg (25 mg Oral $Given 2/2/24 1902)   ibuprofen (ADVIL/MOTRIN) tablet 800 mg (800 mg Oral Not Given 2/2/24 1903)             Critical care was not performed.     Medical Decision Making  The patient's presentation was of moderate complexity (an acute complicated injury).    The patient's evaluation involved:  review of external note(s) from 3+ sources (see separate area of note for details)  review of 3+ test result(s) ordered prior to this encounter (see separate area of note for details)  ordering and/or review of 3+ test(s) in this encounter (see separate area of note for details)    The patient's management necessitated high risk (a decision regarding hospitalization).    Assessments & Plan (with Medical Decision Making)     Abrahan Tucker is a 38 year old male who who has a history significant for type 2 diabetes arriving today to the emergency department evaluation of elevated blood pressure.  On arrival to the emergency department the patient is noted to be alert.  Is presently afebrile.  Patient with fairly significant elevation in systolic and diastolic blood pressure.  The patient reports this is fairly new over the past 1 week.  This seems to be some component secondary to pain.  I would plan to treat the pain with ibuprofen.  He is already prescribed antibiotics which I would agree with and will [place] back with his dentist.  Further the patient has been fasting.  He does not appear to be significant fluid down.  Low suspicion for acute cardiac  strain.  No presence of neurovascular deficit.  GCS 15 without signs of focal neurologic deficit.  Low suspicion for endorgan dysfunction clinically but, secondary to persistent elevation in blood pressure would plan for EKG, troponin and CMP.  I did obtain an EKG which in my interpretation demonstrates normal sinus rhythm and rate.  No sign of ST changes, PVCs or obvious interval abnormality.  LVH present consistent with prior EKGs.  I did discuss diet and exercise with patient as I suspect this would improve blood pressure.  With persistent elevation of blood pressure I would plan for initiation of HCTZ.  First dose was given in the emergency department repeat blood pressures demonstrating, unfortunately further elevation of blood pressure to 220/102 systolically.  Initial plan was if decreasing blood pressure and abnormal laboratory studies would work to get this patient home.  Patient does have presence of an ROSEANN with unclear acuity.  Further patient does have a troponin elevation which is modest.  My suspicion for ACS is low and we will plan to trend however when discussing options with patient's he does report he has had increasing acid reflux over the past several days.  With constellation of persistent elevation of blood pressure despite oral medications, and several organs demonstrating at least some degree of strain I would favor overnight hospitalization for control of blood pressure and sugar no uptrend in troponin improving creatinine.  Patient may benefit from stress testing should to be any further rise in troponin.    I have reviewed the nursing notes.    I have reviewed the findings, diagnosis, plan and need for follow up with the patient.    New Prescriptions    No medications on file       Final diagnoses:   Hypertension, unspecified type   Acute kidney injury (H24)   Elevated troponin       [facility] CAI MD    2/2/2024   [facility] EMERGENCY DEPARTMENT     Singh Cai  MD  02/02/24 204

## 2024-02-03 NOTE — DISCHARGE SUMMARY
"[facility]    Internal Medicine Discharge Summary - Hospitalist Service, [facility]    Date of Admission: 2/2/2024  Date of Discharge: 2/3/2024  Discharging Provider: Dorothy Choe PA-C  Discharging Team: [facility]    Follow-ups Needed After Discharge   PCP as scheduled next week     Discharge Diagnoses   #Hypertensive Urgency, Resolved  #HTN  #DM II  #CKD  #GERD  #Obesity    Hospital Course   Abrahan Tucker is a 38 year old man with a history of obesity and DM II who presented to ED with elevated blood pressure. A few days prior he had been declined a procedure for dental infection due to elevated BP. He has a home cuff and has noted intermittent elevations (SBPs 160s) for ~1 year. He has been gaining weight. On arrival here /100. Following 25 mg hydrochlorothiazide BP improved to 160s/90 and later 130s/70s. His troponin was elevated to low 40s without chest pain or ischemic changes on EKG and trended flat. Low HEART score without further cardiac workup indicated here. He will discharge on hydrochlorothiazide 25 mg (new prescription) and have close follow up with his PCP as scheduled next week. Resuming home diabetic regimen. Prescribed PPI trial for GERD symptoms (primary care follow up to follow effect). Of note, Cr 1.42 on admit from from 1.43 in 11/2023 and 1.3 in 4/2023 --> 1.28 today. Suspected CKD in setting of HTN/DM. Discussed lifestyle changes and need for close primary care follow up, possible OP dietitian eval. Patient feels ready to return home.     Consultations This Hospital Stay   None    Physical Exam   Blood pressure 134/74, pulse 59, temperature 98.6  F (37  C), temperature source Oral, resp. rate 18, height 1.727 m (5' 8\"), weight 120.2 kg (265 lb), SpO2 100%.  GENERAL: Alert and oriented x 3. Sitting up in bed, appears comfortable. Pleasant and conversant.   HEENT: Anicteric sclera. Mucous membranes moist.   CV: RRR. S1, S2. No " murmurs appreciated.   RESPIRATORY: Effort normal on room air. Lungs CTAB with no wheezing, rales, rhonchi.   GI: Abdomen soft, non distended, non tender.   NEUROLOGICAL: No focal deficits. Moves all extremities.   EXTREMITIES: No peripheral edema.   SKIN: No jaundice. No rashes.      Significant Results and Procedures   None    Pending Results   Unresulted Labs Ordered in the Past 30 Days of this Admission       No orders found for last 31 day(s).            Primary Care Physician   Pool Mcdowell    Discharge Disposition   Discharged to home  Condition at discharge: Stable    Code Status   Full Code    Discharge Orders      Reason for your hospital stay    You were admitted with high blood pressure and started on a blood pressure medication.     Activity    Your activity upon discharge: activity as tolerated     Follow Up and recommended labs and tests    Please follow up with PCP as scheduled.     Diet    Follow this diet upon discharge: Regular, low sodium       Discharge Medications   Current Discharge Medication List        START taking these medications    Details   hydrochlorothiazide (HYDRODIURIL) 25 MG tablet Take 1 tablet (25 mg) by mouth daily  Qty: 30 tablet, Refills: 0    Associated Diagnoses: Hypertension, unspecified type      pantoprazole (PROTONIX) 40 MG EC tablet Take 1 tablet (40 mg) by mouth every morning (before breakfast)  Qty: 30 tablet, Refills: 0    Associated Diagnoses: Gastroesophageal reflux disease, unspecified whether esophagitis present           CONTINUE these medications which have CHANGED    Details   NOVOLOG FLEXPEN 100 UNIT/ML soln Inject 4 Units Subcutaneous 3 times daily (with meals) AND 2 units per carb with other meals    Associated Diagnoses: Type 2 diabetes mellitus without complication, with long-term current use of insulin (H)           CONTINUE these medications which have NOT CHANGED    Details   amoxicillin (AMOXIL) 500 MG tablet Take 500 mg by mouth 3 times daily For 7  days      atropine 1 % ophthalmic solution INSTILL 1 DROP IN LEFT EYE EVERY EVENING      BD PEN NEEDLE LUIS ALFREDO 2ND GEN 32G X 4 MM miscellaneous USE 6 TIMES DAILY AS DIRECTED      brimonidine (ALPHAGAN) 0.2 % ophthalmic solution Place 1 drop Into the left eye 2 times daily      Continuous Blood Gluc Sensor (DEXCOM G6 SENSOR) MISC CHANGE SENSOR EVERY 10 DAYS      dorzolamide-timolol (COSOPT) 2-0.5 % ophthalmic solution INSTILL 1 DROP IN LEFT EYE TWICE DAILY      LANTUS SOLOSTAR 100 UNIT/ML soln Inject 40 Units Subcutaneous at bedtime  Refills: 0      latanoprost (XALATAN) 0.005 % ophthalmic solution INSTILL 1 DROP IN LEFT EYE EVERY EVENING             Time Spent on this Encounter   I, ROMEO Gutierres, personally saw the patient today and spent greater than 30 minutes discharging this patient.    Dorothy Choe PA-C  Hospitalist Service  [facility]  Pager: [phone]

## 2024-02-03 NOTE — PROGRESS NOTES
Addendum to H&P    Informed that patient has been fasting all day, does not want 40 units Lantus. Also noted that he is on Novolog just 4 units TID with meals.     Adjusted orders to:  Lantus 15 units nightly  Novolog 4 units TID AC.

## 2024-02-03 NOTE — MEDICATION SCRIBE - ADMISSION MEDICATION HISTORY
Medication Scribe Admission Medication History    Admission medication history is complete. The information provided in this note is only as accurate as the sources available at the time of the update.    Information Source(s): Patient and CareEverywhere/SureScripts via in-person    Pertinent Information:   -Pt stated he has not picked up his amoxicillin 500 mg yet.  -Pt stated he has been fasting sense Wednesday so he has not taken his insulin sense then    Changes made to PTA medication list:  Added: Amoxicillin 500 mg, Atropine 1%, Cosopt 2-0.5%, Dexcom G6, Latanoprost 0.005%, Brimonidine 0.2%, Novolog pen  Deleted: Augmentin 875-125 mg, NORCO 5-325 mg, Humalog kwickpen, terbinafine 250 mg  Changed: Lantus pen (no frequency) --> Lantus pen (40 units hs)    Medication Affordability:       Allergies reviewed with patient and updates made in EHR: yes    Medication History Completed By: Raquel Bowen 2/2/2024 9:18 PM    Prior to Admission medications    Medication Sig Last Dose Taking? Auth Provider Long Term End Date   amoxicillin (AMOXIL) 500 MG tablet Take 500 mg by mouth 3 times daily For 7 days  Yes Reported, Patient     atropine 1 % ophthalmic solution INSTILL 1 DROP IN LEFT EYE EVERY EVENING 2/1/2024 at pm Yes Reported, Patient     BD PEN NEEDLE LUIS ALFREDO 2ND GEN 32G X 4 MM miscellaneous USE 6 TIMES DAILY AS DIRECTED  Yes Reported, Patient     brimonidine (ALPHAGAN) 0.2 % ophthalmic solution Place 1 drop Into the left eye 2 times daily 2/2/2024 at am Yes Reported, Patient     Continuous Blood Gluc Sensor (DEXCOM G6 SENSOR) MISC CHANGE SENSOR EVERY 10 DAYS 1/29/2024 at unknown Yes Reported, Patient     dorzolamide-timolol (COSOPT) 2-0.5 % ophthalmic solution INSTILL 1 DROP IN LEFT EYE TWICE DAILY 2/2/2024 at am Yes Reported, Patient     LANTUS SOLOSTAR 100 UNIT/ML soln Inject 40 units subcutaneous at bedtime 1/31/2024 at unknown Yes Reported, Patient Yes    latanoprost (XALATAN) 0.005 % ophthalmic solution INSTILL 1  DROP IN LEFT EYE EVERY EVENING 2/1/2024 at pm Yes Reported, Patient Yes    NOVOLOG FLEXPEN 100 UNIT/ML soln Inject 26 Units Subcutaneous daily (with breakfast) AND 2 units per carb with other meals 1/31/2024 at unknown Yes Reported, Patient Yes

## 2024-02-03 NOTE — PROGRESS NOTES
Patient discharged from the hospital to home at 1040. IV removed, discharge instructions reviewed and given to pt. Pt had no questions or concerns.

## 2024-04-08 ENCOUNTER — HOSPITAL ENCOUNTER (EMERGENCY)
Facility: CLINIC | Age: 39
Discharge: HOME | End: 2024-04-08
Attending: EMERGENCY MEDICINE | Admitting: EMERGENCY MEDICINE
Payer: COMMERCIAL

## 2024-04-08 VITALS
HEART RATE: 69 BPM | TEMPERATURE: 98.4 F | OXYGEN SATURATION: 100 % | RESPIRATION RATE: 18 BRPM | DIASTOLIC BLOOD PRESSURE: 87 MMHG | SYSTOLIC BLOOD PRESSURE: 153 MMHG

## 2024-04-08 DIAGNOSIS — I10 HYPERTENSION, UNSPECIFIED TYPE: ICD-10-CM

## 2024-04-08 DIAGNOSIS — R51.9 NONINTRACTABLE HEADACHE, UNSPECIFIED CHRONICITY PATTERN, UNSPECIFIED HEADACHE TYPE: ICD-10-CM

## 2024-04-08 LAB
ANION GAP SERPL CALCULATED.3IONS-SCNC: 13 MMOL/L (ref 7–15)
BASOPHILS # BLD AUTO: 0 10E3/UL (ref 0–0.2)
BASOPHILS NFR BLD AUTO: 1 %
BUN SERPL-MCNC: 17.8 MG/DL (ref 6–20)
CALCIUM SERPL-MCNC: 9.7 MG/DL (ref 8.6–10)
CHLORIDE SERPL-SCNC: 104 MMOL/L (ref 98–107)
CREAT SERPL-MCNC: 1.64 MG/DL (ref 0.67–1.17)
DEPRECATED HCO3 PLAS-SCNC: 24 MMOL/L (ref 22–29)
EGFRCR SERPLBLD CKD-EPI 2021: 54 ML/MIN/1.73M2
EOSINOPHIL # BLD AUTO: 0 10E3/UL (ref 0–0.7)
EOSINOPHIL NFR BLD AUTO: 1 %
ERYTHROCYTE [DISTWIDTH] IN BLOOD BY AUTOMATED COUNT: 14.5 % (ref 10–15)
GLUCOSE SERPL-MCNC: 126 MG/DL (ref 70–99)
HCT VFR BLD AUTO: 40.5 % (ref 40–53)
HGB BLD-MCNC: 13.1 G/DL (ref 13.3–17.7)
IMM GRANULOCYTES # BLD: 0 10E3/UL
IMM GRANULOCYTES NFR BLD: 0 %
LYMPHOCYTES # BLD AUTO: 2.3 10E3/UL (ref 0.8–5.3)
LYMPHOCYTES NFR BLD AUTO: 41 %
MCH RBC QN AUTO: 28.7 PG (ref 26.5–33)
MCHC RBC AUTO-ENTMCNC: 32.3 G/DL (ref 31.5–36.5)
MCV RBC AUTO: 89 FL (ref 78–100)
MONOCYTES # BLD AUTO: 0.4 10E3/UL (ref 0–1.3)
MONOCYTES NFR BLD AUTO: 6 %
NEUTROPHILS # BLD AUTO: 2.9 10E3/UL (ref 1.6–8.3)
NEUTROPHILS NFR BLD AUTO: 51 %
NRBC # BLD AUTO: 0 10E3/UL
NRBC BLD AUTO-RTO: 0 /100
PLATELET # BLD AUTO: 333 10E3/UL (ref 150–450)
POTASSIUM SERPL-SCNC: 4.4 MMOL/L (ref 3.4–5.3)
RBC # BLD AUTO: 4.56 10E6/UL (ref 4.4–5.9)
SODIUM SERPL-SCNC: 141 MMOL/L (ref 135–145)
WBC # BLD AUTO: 5.6 10E3/UL (ref 4–11)

## 2024-04-08 PROCEDURE — 96360 HYDRATION IV INFUSION INIT: CPT | Performed by: EMERGENCY MEDICINE

## 2024-04-08 PROCEDURE — 85004 AUTOMATED DIFF WBC COUNT: CPT | Performed by: PHYSICIAN ASSISTANT

## 2024-04-08 PROCEDURE — 36415 COLL VENOUS BLD VENIPUNCTURE: CPT | Performed by: PHYSICIAN ASSISTANT

## 2024-04-08 PROCEDURE — 80048 BASIC METABOLIC PNL TOTAL CA: CPT | Performed by: PHYSICIAN ASSISTANT

## 2024-04-08 PROCEDURE — 258N000003 HC RX IP 258 OP 636: Performed by: PHYSICIAN ASSISTANT

## 2024-04-08 PROCEDURE — 96361 HYDRATE IV INFUSION ADD-ON: CPT | Performed by: EMERGENCY MEDICINE

## 2024-04-08 PROCEDURE — 99284 EMERGENCY DEPT VISIT MOD MDM: CPT | Performed by: EMERGENCY MEDICINE

## 2024-04-08 PROCEDURE — 250N000013 HC RX MED GY IP 250 OP 250 PS 637: Performed by: PHYSICIAN ASSISTANT

## 2024-04-08 PROCEDURE — 99284 EMERGENCY DEPT VISIT MOD MDM: CPT | Mod: 25 | Performed by: EMERGENCY MEDICINE

## 2024-04-08 RX ORDER — ACETAMINOPHEN 500 MG
1000 TABLET ORAL ONCE
Status: COMPLETED | OUTPATIENT
Start: 2024-04-08 | End: 2024-04-08

## 2024-04-08 RX ADMIN — ACETAMINOPHEN 1000 MG: 500 TABLET ORAL at 16:11

## 2024-04-08 RX ADMIN — SODIUM CHLORIDE 1000 ML: 9 INJECTION, SOLUTION INTRAVENOUS at 18:15

## 2024-04-08 ASSESSMENT — ACTIVITIES OF DAILY LIVING (ADL)
ADLS_ACUITY_SCORE: 35
ADLS_ACUITY_SCORE: 33
ADLS_ACUITY_SCORE: 35

## 2024-04-08 ASSESSMENT — COLUMBIA-SUICIDE SEVERITY RATING SCALE - C-SSRS
2. HAVE YOU ACTUALLY HAD ANY THOUGHTS OF KILLING YOURSELF IN THE PAST MONTH?: NO
1. IN THE PAST MONTH, HAVE YOU WISHED YOU WERE DEAD OR WISHED YOU COULD GO TO SLEEP AND NOT WAKE UP?: NO
6. HAVE YOU EVER DONE ANYTHING, STARTED TO DO ANYTHING, OR PREPARED TO DO ANYTHING TO END YOUR LIFE?: NO

## 2024-04-08 NOTE — ED PROVIDER NOTES
ED Provider Note  [facility]      History     Chief Complaint   Patient presents with    Hypertension    Headache     HPI  39yo M pmhx HTN and DM P/W concern for hypertension and headache.  Patient states that he checked his blood pressures yesterday, and they were in the 170 systolic.  This was associated with a gradual onset headache, but resolved, and then recurred again today.  He does note however, that his BPs this morning were in the 130 systolic, and he also with headache.  No associated acute vision changes, extremity paresthesias or weakness, chest pain, shortness of breath, dizziness, palpitations.  Patient reports compliance with his 100 mg losartan and 25 mg of hydrochlorothiazide daily.  Patient called his PCPs office who referred him to the ED today.    Past Medical History  Past Medical History:   Diagnosis Date    Diabetes (H)      No past surgical history on file.  amoxicillin (AMOXIL) 500 MG tablet  atropine 1 % ophthalmic solution  BD PEN NEEDLE LUIS ALFREDO 2ND GEN 32G X 4 MM miscellaneous  brimonidine (ALPHAGAN) 0.2 % ophthalmic solution  Continuous Blood Gluc Sensor (DEXCOM G6 SENSOR) MISC  dorzolamide-timolol (COSOPT) 2-0.5 % ophthalmic solution  hydrochlorothiazide (HYDRODIURIL) 25 MG tablet  LANTUS SOLOSTAR 100 UNIT/ML soln  latanoprost (XALATAN) 0.005 % ophthalmic solution  NOVOLOG FLEXPEN 100 UNIT/ML soln  pantoprazole (PROTONIX) 40 MG EC tablet      Allergies   Allergen Reactions    Metformin     Penicillins      Family History  No family history on file.  Social History   Social History     Tobacco Use    Smoking status: Never    Smokeless tobacco: Never   Substance Use Topics    Alcohol use: No     Alcohol/week: 0.0 standard drinks of alcohol    Drug use: No         A medically appropriate review of systems was performed with pertinent positives and negatives noted in the HPI, and all other systems negative.    Physical Exam   BP: (!) 153/87  Pulse: 69  Temp: 98.4   F (36.9  C)  Resp: 18  SpO2: 100 %  Physical Exam  Constitutional:       General: He is not in acute distress.     Appearance: He is well-developed. He is not diaphoretic.   HENT:      Head: Normocephalic and atraumatic.      Nose: No congestion.      Mouth/Throat:      Mouth: Mucous membranes are moist.   Eyes:      Conjunctiva/sclera: Conjunctivae normal.   Cardiovascular:      Rate and Rhythm: Normal rate and regular rhythm.   Pulmonary:      Effort: Pulmonary effort is normal.      Breath sounds: Normal breath sounds.   Musculoskeletal:      Cervical back: Normal range of motion and neck supple.   Skin:     General: Skin is warm and dry.      Capillary Refill: Capillary refill takes less than 2 seconds.      Findings: No rash.   Neurological:      Mental Status: He is alert and oriented to person, place, and time.      Cranial Nerves: Cranial nerves 2-12 are intact.      Sensory: Sensation is intact.      Motor: Motor function is intact.      Coordination: Coordination is intact.      Gait: Gait is intact.           ED Course, Procedures, & Data      Procedures               Results for orders placed or performed during the hospital encounter of 04/08/24   Basic metabolic panel     Status: Abnormal   Result Value Ref Range    Sodium 141 135 - 145 mmol/L    Potassium 4.4 3.4 - 5.3 mmol/L    Chloride 104 98 - 107 mmol/L    Carbon Dioxide (CO2) 24 22 - 29 mmol/L    Anion Gap 13 7 - 15 mmol/L    Urea Nitrogen 17.8 6.0 - 20.0 mg/dL    Creatinine 1.64 (H) 0.67 - 1.17 mg/dL    GFR Estimate 54 (L) >60 mL/min/1.73m2    Calcium 9.7 8.6 - 10.0 mg/dL    Glucose 126 (H) 70 - 99 mg/dL   CBC with platelets and differential     Status: Abnormal   Result Value Ref Range    WBC Count 5.6 4.0 - 11.0 10e3/uL    RBC Count 4.56 4.40 - 5.90 10e6/uL    Hemoglobin 13.1 (L) 13.3 - 17.7 g/dL    Hematocrit 40.5 40.0 - 53.0 %    MCV 89 78 - 100 fL    MCH 28.7 26.5 - 33.0 pg    MCHC 32.3 31.5 - 36.5 g/dL    RDW 14.5 10.0 - 15.0 %     Platelet Count 333 150 - 450 10e3/uL    % Neutrophils 51 %    % Lymphocytes 41 %    % Monocytes 6 %    % Eosinophils 1 %    % Basophils 1 %    % Immature Granulocytes 0 %    NRBCs per 100 WBC 0 <1 /100    Absolute Neutrophils 2.9 1.6 - 8.3 10e3/uL    Absolute Lymphocytes 2.3 0.8 - 5.3 10e3/uL    Absolute Monocytes 0.4 0.0 - 1.3 10e3/uL    Absolute Eosinophils 0.0 0.0 - 0.7 10e3/uL    Absolute Basophils 0.0 0.0 - 0.2 10e3/uL    Absolute Immature Granulocytes 0.0 <=0.4 10e3/uL    Absolute NRBCs 0.0 10e3/uL   CBC with platelets differential     Status: Abnormal    Narrative    The following orders were created for panel order CBC with platelets differential.  Procedure                               Abnormality         Status                     ---------                               -----------         ------                     CBC with platelets and d...[[id number]]  Abnormal            Final result                 Please view results for these tests on the individual orders.     Medications   acetaminophen (TYLENOL) tablet 1,000 mg (1,000 mg Oral $Given 4/8/24 1611)   sodium chloride 0.9% BOLUS 1,000 mL (1,000 mLs Intravenous $New Bag 4/8/24 9677)     Labs Ordered and Resulted from Time of ED Arrival to Time of ED Departure   BASIC METABOLIC PANEL - Abnormal       Result Value    Sodium 141      Potassium 4.4      Chloride 104      Carbon Dioxide (CO2) 24      Anion Gap 13      Urea Nitrogen 17.8      Creatinine 1.64 (*)     GFR Estimate 54 (*)     Calcium 9.7      Glucose 126 (*)    CBC WITH PLATELETS AND DIFFERENTIAL - Abnormal    WBC Count 5.6      RBC Count 4.56      Hemoglobin 13.1 (*)     Hematocrit 40.5      MCV 89      MCH 28.7      MCHC 32.3      RDW 14.5      Platelet Count 333      % Neutrophils 51      % Lymphocytes 41      % Monocytes 6      % Eosinophils 1      % Basophils 1      % Immature Granulocytes 0      NRBCs per 100 WBC 0      Absolute Neutrophils 2.9      Absolute Lymphocytes 2.3      Absolute  Monocytes 0.4      Absolute Eosinophils 0.0      Absolute Basophils 0.0      Absolute Immature Granulocytes 0.0      Absolute NRBCs 0.0       No orders to display          Critical care was not performed.     Medical Decision Making  The patient's presentation was of moderate complexity (a chronic illness mild to moderate exacerbation, progression, or side effect of treatment).    The patient's evaluation involved:  review of external note(s) from 3+ sources (clinic)  review of 3+ test result(s) ordered prior to this encounter (labs)  ordering and/or review of 3+ test(s) in this encounter (see separate area of note for details)    The patient's management necessitated high risk (a decision regarding hospitalization).    Assessment & Plan    39yo M pmhx HTN and DM P/W concern for hypertension and headache.  Reports pressures in the 170 systolic yesterday.  This was associated with a gradual onset headache, which resolved, and then recurred again today.  He does note that his BPs this morning were in the 130 systolic, and he also had the headache.  No neurosymptoms, CP, SOB, dizziness, palpitations.  Reports compliance with his 100 mg losartan and 25 mg of hydrochlorothiazide daily.  In the ED patient is minimally hypertensive (153/87) but is hemodynamic stable.  He is well-appearing.  He is in no acute distress.  He is neurologically intact.    As patient has headache even when his pressures were WNL, low suspicion that this is malignant headache secondary hypertensive emergency.  Headache is gradual in onset, and intermittent, making suspicion for intracranial bleed or mass low.  He is otherwise asymptomatic and neurologically intact.  On chart review, however, patient does have gradually worsening renal function.  Given this we elected to obtain a BMP today which showed slightly worsening creatinine at 1.64; previously 1.28 and 1.41 respectively.  CBC unremarkable.  Patient received a liter of fluid in the ED and a  gram of Tylenol with resolution of his headache.  Do not feel that patient's worsening creatinine necessitates admission and workup today.  He will be discharged with instructions to follow-up with his PCP, and was given ER return precautions for worsening symptoms.    I have reviewed the nursing notes. I have reviewed the findings, diagnosis, plan and need for follow up with the patient.    New Prescriptions    No medications on file       Final diagnoses:   Hypertension, unspecified type   Nonintractable headache, unspecified chronicity pattern, unspecified headache type         Steffen Adorno PA-C  [facility] EMERGENCY DEPARTMENT  4/8/2024     Steffen Adorno PA-C  04/08/24 1934

## 2024-04-08 NOTE — ED TRIAGE NOTES
"Pt said BP has been fluctuating everyday, highest yesterday 177/101. On BP meds. Today in triage 153/87. Pt also has started to get a headaches w high Bps, says it is not painful but \"foggy\".         "

## 2024-04-09 NOTE — DISCHARGE INSTRUCTIONS
TODAY'S VISIT:  You were seen today for high blood pressure, headache  -   - If you had any labs or imaging/radiology tests performed today, you should also discuss these tests with your usual provider.     FOLLOW-UP:  Please make an appointment to follow up with:  - Your Primary Care Provider. If you do not have a PCP, please call the Primary Care Center (phone: [phone] for an appointment    - Have your provider review the results from today's visit with you again to make sure no further follow-up or additional testing is needed based on those results.     RETURN TO THE EMERGENCY DEPARTMENT  Return to the Emergency Department at any time for any new or worsening symptoms or any concerns, particularly any sudden onset severe headache, chest pain/shortness of breath, increased leg swelling, or neurological symptoms such as numbness, weakness, tingling, vision changes, difficulty with speech or walking.

## 2024-10-31 ENCOUNTER — DOCUMENTATION ONLY (OUTPATIENT)
Dept: ORTHOPEDICS | Facility: CLINIC | Age: 39
End: 2024-10-31

## 2024-10-31 ENCOUNTER — ANCILLARY PROCEDURE (OUTPATIENT)
Dept: GENERAL RADIOLOGY | Facility: CLINIC | Age: 39
End: 2024-10-31
Attending: NURSE PRACTITIONER
Payer: COMMERCIAL

## 2024-10-31 ENCOUNTER — TELEPHONE (OUTPATIENT)
Dept: ORTHOPEDICS | Facility: CLINIC | Age: 39
End: 2024-10-31

## 2024-10-31 ENCOUNTER — OFFICE VISIT (OUTPATIENT)
Dept: URGENT CARE | Facility: URGENT CARE | Age: 39
End: 2024-10-31
Payer: COMMERCIAL

## 2024-10-31 VITALS
HEART RATE: 76 BPM | SYSTOLIC BLOOD PRESSURE: 122 MMHG | DIASTOLIC BLOOD PRESSURE: 75 MMHG | OXYGEN SATURATION: 99 % | RESPIRATION RATE: 18 BRPM | TEMPERATURE: 97.8 F

## 2024-10-31 DIAGNOSIS — S93.325A LISFRANC DISLOCATION, LEFT, INITIAL ENCOUNTER: Primary | ICD-10-CM

## 2024-10-31 DIAGNOSIS — M25.572 PAIN IN JOINT INVOLVING ANKLE AND FOOT, LEFT: ICD-10-CM

## 2024-10-31 PROCEDURE — 73610 X-RAY EXAM OF ANKLE: CPT | Mod: TC | Performed by: STUDENT IN AN ORGANIZED HEALTH CARE EDUCATION/TRAINING PROGRAM

## 2024-10-31 PROCEDURE — 99203 OFFICE O/P NEW LOW 30 MIN: CPT | Mod: 25 | Performed by: NURSE PRACTITIONER

## 2024-10-31 PROCEDURE — 73630 X-RAY EXAM OF FOOT: CPT | Mod: TC | Performed by: STUDENT IN AN ORGANIZED HEALTH CARE EDUCATION/TRAINING PROGRAM

## 2024-10-31 PROCEDURE — 29515 APPLICATION SHORT LEG SPLINT: CPT | Mod: LT | Performed by: NURSE PRACTITIONER

## 2024-10-31 ASSESSMENT — ENCOUNTER SYMPTOMS
RESPIRATORY NEGATIVE: 1
CARDIOVASCULAR NEGATIVE: 1
CONSTITUTIONAL NEGATIVE: 1
EYES NEGATIVE: 1

## 2024-10-31 ASSESSMENT — PAIN SCALES - GENERAL: PAINLEVEL_OUTOF10: NO PAIN (0)

## 2024-10-31 NOTE — PROGRESS NOTES
Assessment & Plan       ICD-10-CM    1. Lisfranc dislocation, left, initial encounter  S93.325A Orthopedic  Referral     Crutches Order for DME - ONLY FOR DME      2. Pain in joint involving ankle and foot, left  M25.572 XR Foot Left G/E 3 Views     XR Ankle Left G/E 3 Views      Spoke to Orthopedic Surgery on-call physician. Instructed to put patient in a posterior ortho-glass splint and place on crutches, non weight bearing.     Emergent referral.     Posterior splint applied and neurovascularly intact.     Patient Instructions   Keep splint on until seen by Orthopedics  Keep clean and dry    Non weight bearing, use crutches    Orthopedics should contact you in the next day or two    Lisfranc fracture  Rest: Stay off the affected site, elevate above the level of the heart as much as possible.  Ice: Ice several times throughout the day. Perform 20 minutes on, 20 minutes off 4 or more times per day as needed.  Compression: Walking boot and crutches can provide support and stability and improve overall mobility  Elevation: Rest and elevate the affected site, may elevate on pillow, try to elevate above heart (lay flat with injury propped higher than body)    Tylenol 1000 mg every 8 hours as needed        Follow up with any problems, questions or concerns or if symptoms worsen or fail to improve. Patient agreed to plan and verbalized understanding.     Results for orders placed or performed in visit on 10/31/24   XR Foot Left G/E 3 Views     Status: None    Narrative    XR FOOT LEFT G/E 3 VIEWS, XR ANKLE LEFT G/E 3 VIEWS 10/31/2024 2:03 PM      HISTORY: Pain in joint involving ankle and foot, left    COMPARISON: None.       Impression    IMPRESSION:    There is a Lisfranc fracture dislocation including significant  widening of the Lisfranc joint, displaced middle cuneiform into the  Lisfranc interval, and possible additional fractures in the mid foot.  CT could be obtained for further assessment.     NOTE:  ABNORMAL REPORT    THE DICTATION ABOVE DESCRIBES AN ABNORMAL REPORT FOR WHICH FOLLOW-UP  IS NEEDED.    BRENDEN APARICIO MD         SYSTEM ID:  [id number]   XR Ankle Left G/E 3 Views     Status: None    Narrative    XR FOOT LEFT G/E 3 VIEWS, XR ANKLE LEFT G/E 3 VIEWS 10/31/2024 2:03 PM      HISTORY: Pain in joint involving ankle and foot, left    COMPARISON: None.       Impression    IMPRESSION:    There is a Lisfranc fracture dislocation including significant  widening of the Lisfranc joint, displaced middle cuneiform into the  Lisfranc interval, and possible additional fractures in the mid foot.  CT could be obtained for further assessment.     NOTE: ABNORMAL REPORT    THE DICTATION ABOVE DESCRIBES AN ABNORMAL REPORT FOR WHICH FOLLOW-UP  IS NEEDED.    BRENDEN APARICIO MD         SYSTEM ID:  [id number]         Marixa Gauthier is a 39 year old male who presents to clinic today for the following health issues:  Chief Complaint   Patient presents with    Urgent Care    Ankle Pain     Patient presents with his left ankle swollen after tripping over a scooter 4x days ago.     HPI  Patient states 2 days ago he was walking outside his apartment building and ran into a lime scooter with his left foot causing him to tumble forward.  He states since then has left foot along the lateral edge has been uncomfortable.  He states he has been bearing weight.  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  He denies any other injury.  He states elevation has helped to reduce the swelling.  He states he has not wrapped the site or utilized Tylenol. (He cannot utilize ibuprofen due to his kidney function.)  He denies any previous injury to this foot or surgery.      Review of Systems   Constitutional: Negative.    HENT: Negative.     Eyes: Negative.    Respiratory: Negative.     Cardiovascular: Negative.    Musculoskeletal:         Left foot and ankle edema with discomfort  over the last couple of days since hitting his foot on a scooter.       Problem List:  2024-02: Elevated troponin  2024-02: Acute kidney injury (H)  2024-02: Hypertension, unspecified type      Past Medical History:   Diagnosis Date    Diabetes (H)          Social History     Tobacco Use    Smoking status: Never    Smokeless tobacco: Never   Substance Use Topics    Alcohol use: No     Alcohol/week: 0.0 standard drinks of alcohol           Objective    /75 (BP Location: Right arm)   Pulse 76   Temp 97.8  F (36.6  C) (Temporal)   Resp 18   SpO2 99%   Physical Exam  Vitals and nursing note reviewed.   Constitutional:       Appearance: Normal appearance.   HENT:      Head: Normocephalic and atraumatic.   Cardiovascular:      Rate and Rhythm: Normal rate and regular rhythm.      Heart sounds: Normal heart sounds.   Pulmonary:      Effort: Pulmonary effort is normal.      Breath sounds: Normal breath sounds.   Musculoskeletal:         General: Swelling (moderate - mainly localized to the foot.) and tenderness (Left lateral foot) present.      Comments: Onychomycosis of toe nails.   Skin:     General: Skin is warm and dry.   Neurological:      Mental Status: He is alert.              Juhi Moise NP

## 2024-10-31 NOTE — PATIENT INSTRUCTIONS
Keep splint on until seen by Orthopedics  Keep clean and dry    Non weight bearing, use crutches    Orthopedics should contact you in the next day or two    Lisfranc fracture  Rest: Stay off the affected site, elevate above the level of the heart as much as possible.  Ice: Ice several times throughout the day. Perform 20 minutes on, 20 minutes off 4 or more times per day as needed.  Compression: Walking boot and crutches can provide support and stability and improve overall mobility  Elevation: Rest and elevate the affected site, may elevate on pillow, try to elevate above heart (lay flat with injury propped higher than body)    Tylenol 1000 mg every 8 hours as needed

## 2024-10-31 NOTE — TELEPHONE ENCOUNTER
ATC called patient to schedule appt with Dr. Naidu.  No answer.  Left detailed VM letting him know we have a spot on hold for him at 3:40pm tomorrow to schedule a NEW foot/ankle appointment with Dr. Naidu.  Provided our callback number and clinic address/location of orthopedics.    Call center, if patient returns call, please offer this appointment for his left foot Lisfranc fracture/dislocation.  If he schedules with us, please let him know we may be reaching out to get a pre-visit CT scan of his left foot for further diagnosis of his injury.    Thanks,  Jori Villarreal, MS, ATC, LAT, [facility]  Dr. Parmjit Naidu

## 2024-11-01 ENCOUNTER — PRE VISIT (OUTPATIENT)
Dept: ORTHOPEDICS | Facility: CLINIC | Age: 39
End: 2024-11-01

## 2024-11-01 ENCOUNTER — TELEPHONE (OUTPATIENT)
Dept: ORTHOPEDICS | Facility: CLINIC | Age: 39
End: 2024-11-01

## 2024-11-01 ENCOUNTER — TELEPHONE (OUTPATIENT)
Dept: OTHER | Age: 39
End: 2024-11-01

## 2024-11-01 DIAGNOSIS — S93.326A LISFRANC DISLOCATION: Primary | ICD-10-CM

## 2024-11-01 NOTE — TELEPHONE ENCOUNTER
Action November 1, 2024 10:02 AM MT   Action Taken Sent a request for imaging from [facility].       DIAGNOSIS: Lisfranc dislocation, left, initial encounter [S93.325A]  - Primary   APPOINTMENT DATE: 11/01/2024   NOTES STATUS DETAILS   OFFICE NOTE from referring provider Internal 10/31/2024 -  [facility] [place]     XRAYS (IMAGES & REPORTS) In process Internal    [facility]:  01/13/2016 - Left Foot

## 2024-11-01 NOTE — TELEPHONE ENCOUNTER
Called patient to verify patient is headed to the emergency department and which one. Abrahan said he does plan on heading to the ED as soon as he can but has not yes secured a ride there. I reiterated how important it is to go there tonight if he can and to call us right away if any new concerns arise.  Bita Yi ATC

## 2024-11-01 NOTE — TELEPHONE ENCOUNTER
Spoke with patient and asked if he can do the CT scan at 3 prior to the visit? He is ok with that but said his medical ride is picking him up at 2:40pm. He is less than 20 minutes from clinic, so I said please check in for the 3 pm CT when he arrives in hopes he can do the scan. If he does not get here in time, we will just have to reschedule it to another date/time. He sees Dr Naidu at 3:40pm today.  Bita Yi ATC

## 2024-11-01 NOTE — TELEPHONE ENCOUNTER
Writer called pt back after had to cancel today's appointment due to medical transportation issues. Provider was made aware as pt had been urgently added to clinic. Provider's instructions are for pt to go to the Emergency Room now for surgery now. Writer called and informed pt to go to the emergency room now to be treated.     Kitty Combs LPN

## 2024-11-01 NOTE — TELEPHONE ENCOUNTER
Other: pt is scheduled today with Dr. Naidu in the hold spot created for him.  Please review adding a CT scan beforehand, see previous TE.       Could we send this information to you in [employer] or would you prefer to receive a phone call?:   Patient would prefer a phone call   Okay to leave a detailed message?: Yes at Cell number on file:    Telephone Information:   Mobile [phone]

## 2024-11-04 NOTE — PROGRESS NOTES
Orthopedic/Sports Medicine Fracture Triage    Incoming call/or message from  Dr. Guardado via Aaliyah Hancock ATC .    Fracture type: Foot.    The patient is in a   splint .    Date of injury 10/29/24.    Triaged by: Dr. Guardado .    Determined to be managed Surgically.    Needs to be seen ASAP.    Additional Comments/information:     Jori Villarreal, ATC

## 2024-11-06 NOTE — TELEPHONE ENCOUNTER
ATC called patient, left VM reviewing our recommendations from last Friday that patient present to ED for urgent surgery.  Offered our phone number if he needs to call us back.  ATC expressed that our recommendation would still be for him to present to the emergency department today to get his left foot fixed.    Jori Villarreal MS, ATC, LAT, [facility]  Dr. Parmjit Naidu

## 2025-04-26 ENCOUNTER — HOSPITAL ENCOUNTER (EMERGENCY)
Facility: CLINIC | Age: 40
Discharge: HOME | End: 2025-04-27
Attending: EMERGENCY MEDICINE | Admitting: EMERGENCY MEDICINE
Payer: COMMERCIAL

## 2025-04-26 DIAGNOSIS — I10 HYPERTENSION, UNSPECIFIED TYPE: ICD-10-CM

## 2025-04-26 PROCEDURE — 99283 EMERGENCY DEPT VISIT LOW MDM: CPT | Performed by: EMERGENCY MEDICINE

## 2025-04-26 ASSESSMENT — ACTIVITIES OF DAILY LIVING (ADL): ADLS_ACUITY_SCORE: 42

## 2025-04-27 VITALS
SYSTOLIC BLOOD PRESSURE: 155 MMHG | OXYGEN SATURATION: 99 % | DIASTOLIC BLOOD PRESSURE: 82 MMHG | RESPIRATION RATE: 16 BRPM | HEART RATE: 77 BPM | TEMPERATURE: 98.9 F

## 2025-04-27 LAB
ALBUMIN UR-MCNC: NEGATIVE MG/DL
ANION GAP SERPL CALCULATED.3IONS-SCNC: 12 MMOL/L (ref 7–15)
APPEARANCE UR: CLEAR
BILIRUB UR QL STRIP: NEGATIVE
BUN SERPL-MCNC: 18.5 MG/DL (ref 6–20)
CALCIUM SERPL-MCNC: 9.4 MG/DL (ref 8.8–10.4)
CHLORIDE SERPL-SCNC: 102 MMOL/L (ref 98–107)
COLOR UR AUTO: ABNORMAL
CREAT SERPL-MCNC: 1.53 MG/DL (ref 0.67–1.17)
EGFRCR SERPLBLD CKD-EPI 2021: 58 ML/MIN/1.73M2
GLUCOSE SERPL-MCNC: 134 MG/DL (ref 70–99)
GLUCOSE UR STRIP-MCNC: NEGATIVE MG/DL
HCO3 SERPL-SCNC: 24 MMOL/L (ref 22–29)
HGB UR QL STRIP: NEGATIVE
KETONES UR STRIP-MCNC: ABNORMAL MG/DL
LEUKOCYTE ESTERASE UR QL STRIP: NEGATIVE
NITRATE UR QL: NEGATIVE
PH UR STRIP: 5.5 [PH] (ref 5–7)
POTASSIUM SERPL-SCNC: 4.4 MMOL/L (ref 3.4–5.3)
RBC URINE: 0 /HPF
SODIUM SERPL-SCNC: 138 MMOL/L (ref 135–145)
SP GR UR STRIP: 1.02 (ref 1–1.03)
SPERM #/AREA URNS HPF: PRESENT /HPF
SQUAMOUS EPITHELIAL: <1 /HPF
UROBILINOGEN UR STRIP-MCNC: NORMAL MG/DL
WBC URINE: <1 /HPF

## 2025-04-27 PROCEDURE — 80048 BASIC METABOLIC PNL TOTAL CA: CPT | Performed by: EMERGENCY MEDICINE

## 2025-04-27 PROCEDURE — 36415 COLL VENOUS BLD VENIPUNCTURE: CPT | Performed by: EMERGENCY MEDICINE

## 2025-04-27 PROCEDURE — 81001 URINALYSIS AUTO W/SCOPE: CPT | Performed by: EMERGENCY MEDICINE

## 2025-04-27 ASSESSMENT — ACTIVITIES OF DAILY LIVING (ADL): ADLS_ACUITY_SCORE: 42

## 2025-04-27 NOTE — DISCHARGE INSTRUCTIONS
Continue current medications.  Keep track of your blood pressures and take your blood pressure log with you to your primary care follow-up next week.    Return if chest pain, severe headache, weakness, trouble walking, trouble talking, worsening symptoms, or other concerns.

## 2025-04-27 NOTE — ED PROVIDER NOTES
ED Provider Note  [facility]      History     Chief Complaint   Patient presents with    Hypertension     HPI  Abrahan Tucker is a 39 year old male with a history of type II diabetes and hypertension who presents to the emergency department for hypertension.  The patient states he checked his blood pressure at home earlier today and it was over 200/115.  The patient states he had taken his losartan hydrochlorothiazide earlier in the day.  He took some natural remedies for blood pressure and improved his blood pressure to systolic.  He states that subsequent back up to 190s/80s so he came to the emergency department.  He states that he has had a mild headache that has been intermittent.  He does not have a headache currently.  Denies any photophobia.  No nausea or vomiting.  Denies any weakness or numbness.  Patient denies any chest pain or dyspnea.  No abdominal pain.  He states that he is on 100 mg of losartan and 25 mg of hydrochlorothiazide per day.    Past Medical History  Past Medical History:   Diagnosis Date    Diabetes (H)      No past surgical history on file.  amoxicillin (AMOXIL) 500 MG tablet  atropine 1 % ophthalmic solution  BD PEN NEEDLE LUIS ALFREDO 2ND GEN 32G X 4 MM miscellaneous  brimonidine (ALPHAGAN) 0.2 % ophthalmic solution  Continuous Blood Gluc Sensor (DEXCOM G6 SENSOR) MISC  dorzolamide-timolol (COSOPT) 2-0.5 % ophthalmic solution  hydrochlorothiazide (HYDRODIURIL) 25 MG tablet  LANTUS SOLOSTAR 100 UNIT/ML soln  latanoprost (XALATAN) 0.005 % ophthalmic solution  NOVOLOG FLEXPEN 100 UNIT/ML soln  pantoprazole (PROTONIX) 40 MG EC tablet      Allergies   Allergen Reactions    Metformin     Penicillins      Family History  No family history on file.  Social History   Social History     Tobacco Use    Smoking status: Never    Smokeless tobacco: Never   Substance Use Topics    Alcohol use: No     Alcohol/week: 0.0 standard drinks of alcohol    Drug use: No      A medically  appropriate review of systems was performed with pertinent positives and negatives noted in the HPI, and all other systems negative.    Physical Exam   BP: (!) 192/83  Pulse: 78  Temp: 99.2  F (37.3  C)  Resp: 18  SpO2: 98 %  Physical Exam  Vitals and nursing note reviewed.   Constitutional:       General: He is not in acute distress.     Appearance: He is not diaphoretic.   HENT:      Head: Atraumatic.   Eyes:      General: No scleral icterus.     Pupils: Pupils are equal, round, and reactive to light.   Cardiovascular:      Rate and Rhythm: Normal rate and regular rhythm.      Heart sounds: Normal heart sounds.   Pulmonary:      Effort: No respiratory distress.      Breath sounds: Normal breath sounds.   Abdominal:      General: Bowel sounds are normal.      Palpations: Abdomen is soft.      Tenderness: There is no abdominal tenderness.   Musculoskeletal:         General: No tenderness.   Skin:     General: Skin is warm and dry.      Findings: No rash.   Neurological:      General: No focal deficit present.      Motor: No weakness.      Coordination: Coordination normal.   Psychiatric:         Mood and Affect: Mood normal.           ED Course, Procedures, & Data      Procedures             Results for orders placed or performed during the hospital encounter of 04/26/25   Basic metabolic panel     Status: Abnormal   Result Value Ref Range    Sodium 138 135 - 145 mmol/L    Potassium 4.4 3.4 - 5.3 mmol/L    Chloride 102 98 - 107 mmol/L    Carbon Dioxide (CO2) 24 22 - 29 mmol/L    Anion Gap 12 7 - 15 mmol/L    Urea Nitrogen 18.5 6.0 - 20.0 mg/dL    Creatinine 1.53 (H) 0.67 - 1.17 mg/dL    GFR Estimate 58 (L) >60 mL/min/1.73m2    Calcium 9.4 8.8 - 10.4 mg/dL    Glucose 134 (H) 70 - 99 mg/dL   UA with Microscopic reflex to Culture     Status: Abnormal    Specimen: Urine, Clean Catch   Result Value Ref Range    Color Urine Light Yellow Colorless, Straw, Light Yellow, Yellow    Appearance Urine Clear Clear    Glucose  Urine Negative Negative mg/dL    Bilirubin Urine Negative Negative    Ketones Urine Trace (A) Negative mg/dL    Specific Gravity Urine 1.016 1.003 - 1.035    Blood Urine Negative Negative    pH Urine 5.5 5.0 - 7.0    Protein Albumin Urine Negative Negative mg/dL    Urobilinogen Urine Normal Normal mg/dL    Nitrite Urine Negative Negative    Leukocyte Esterase Urine Negative Negative    Sperm Urine Present (A) None Seen /HPF    RBC Urine 0 <=2 /HPF    WBC Urine <1 <=5 /HPF    Squamous Epithelials Urine <1 <=1 /HPF    Narrative    Urine Culture not indicated     [facility] primary care visit.  Reviewed blood pressure medications including losartan and hydrochlorothiazide.  Reviewed BMP from last year.     Results for orders placed or performed during the hospital encounter of 04/26/25   Basic metabolic panel     Status: Abnormal   Result Value Ref Range    Sodium 138 135 - 145 mmol/L    Potassium 4.4 3.4 - 5.3 mmol/L    Chloride 102 98 - 107 mmol/L    Carbon Dioxide (CO2) 24 22 - 29 mmol/L    Anion Gap 12 7 - 15 mmol/L    Urea Nitrogen 18.5 6.0 - 20.0 mg/dL    Creatinine 1.53 (H) 0.67 - 1.17 mg/dL    GFR Estimate 58 (L) >60 mL/min/1.73m2    Calcium 9.4 8.8 - 10.4 mg/dL    Glucose 134 (H) 70 - 99 mg/dL   UA with Microscopic reflex to Culture     Status: Abnormal    Specimen: Urine, Clean Catch   Result Value Ref Range    Color Urine Light Yellow Colorless, Straw, Light Yellow, Yellow    Appearance Urine Clear Clear    Glucose Urine Negative Negative mg/dL    Bilirubin Urine Negative Negative    Ketones Urine Trace (A) Negative mg/dL    Specific Gravity Urine 1.016 1.003 - 1.035    Blood Urine Negative Negative    pH Urine 5.5 5.0 - 7.0    Protein Albumin Urine Negative Negative mg/dL    Urobilinogen Urine Normal Normal mg/dL    Nitrite Urine Negative Negative    Leukocyte Esterase Urine Negative Negative    Sperm Urine Present (A) None Seen /HPF    RBC Urine 0 <=2 /HPF    WBC Urine <1 <=5 /HPF    Squamous  Epithelials Urine <1 <=1 /HPF    Narrative    Urine Culture not indicated     Medications - No data to display  Labs Ordered and Resulted from Time of ED Arrival to Time of ED Departure   BASIC METABOLIC PANEL - Abnormal       Result Value    Sodium 138      Potassium 4.4      Chloride 102      Carbon Dioxide (CO2) 24      Anion Gap 12      Urea Nitrogen 18.5      Creatinine 1.53 (*)     GFR Estimate 58 (*)     Calcium 9.4      Glucose 134 (*)    ROUTINE UA WITH MICROSCOPIC REFLEX TO CULTURE - Abnormal    Color Urine Light Yellow      Appearance Urine Clear      Glucose Urine Negative      Bilirubin Urine Negative      Ketones Urine Trace (*)     Specific Gravity Urine 1.016      Blood Urine Negative      pH Urine 5.5      Protein Albumin Urine Negative      Urobilinogen Urine Normal      Nitrite Urine Negative      Leukocyte Esterase Urine Negative      Sperm Urine Present (*)     RBC Urine 0      WBC Urine <1      Squamous Epithelials Urine <1       No orders to display       Critical care was not performed.     Medical Decision Making  The patient's presentation was of moderate complexity (a chronic illness mild to moderate exacerbation, progression, or side effect of treatment).    The patient's evaluation involved:  review of external note(s) from 1 sources (see separate area of note for details)  review of 1 test result(s) ordered prior to this encounter (see separate area of note for details)  ordering and/or review of 2 test(s) in this encounter (see separate area of note for details)    The patient's management necessitated only low risk treatment.    Assessment & Plan    39 year old male to the emergency department with concern for elevated blood pressure readings at home.  He has a history of hypertension's and has been compliant with his antihypertensive medication regimen.  He had a mild headache earlier today that is now completely resolved.  He has a normal neurologic examination.  With his lack of  symptoms and well appearance, do not suspect hypertensive emergency including intracranial hemorrhage.  No indication for head CT.  Patient's blood pressure was mildly elevated here on arrival and significantly improved without intervention.  His metabolic panel is baseline with creatinine of approximately 1.5.  His urinalysis does not have significant proteinuria.  The patient appears clinically well and appropriate for outpatient management.  He was asked to keep a blood pressure log and take to his follow-up appointment which she has scheduled with primary care.  Return precautions provided.    I have reviewed the nursing notes. I have reviewed the findings, diagnosis, plan and need for follow up with the patient.    Discharge Medication List as of 4/27/2025  1:18 AM          Final diagnoses:   Hypertension, unspecified type     Chart documentation was completed with Dragon voice-recognition software. Even though reviewed, this chart may still contain some grammatical, spelling, and word errors.       [facility] EMERGENCY DEPARTMENT  4/26/2025     Tulio Landin MD  04/27/25 [id number]